# Patient Record
Sex: MALE | Race: WHITE | NOT HISPANIC OR LATINO | URBAN - METROPOLITAN AREA
[De-identification: names, ages, dates, MRNs, and addresses within clinical notes are randomized per-mention and may not be internally consistent; named-entity substitution may affect disease eponyms.]

---

## 2017-01-06 ENCOUNTER — PRE VISIT (OUTPATIENT)
Dept: UROLOGY | Facility: CLINIC | Age: 67
End: 2017-01-06

## 2017-01-16 ENCOUNTER — TRANSFERRED RECORDS (OUTPATIENT)
Dept: HEALTH INFORMATION MANAGEMENT | Facility: CLINIC | Age: 67
End: 2017-01-16

## 2017-01-18 DIAGNOSIS — C22.0 HEPATOCELLULAR CARCINOMA (H): Primary | ICD-10-CM

## 2017-01-19 ENCOUNTER — HOSPITAL ENCOUNTER (OUTPATIENT)
Dept: GENERAL RADIOLOGY | Facility: CLINIC | Age: 67
End: 2017-01-19
Attending: INTERNAL MEDICINE

## 2017-01-20 ENCOUNTER — OFFICE VISIT (OUTPATIENT)
Dept: UROLOGY | Facility: CLINIC | Age: 67
End: 2017-01-20

## 2017-01-20 ENCOUNTER — DOCUMENTATION ONLY (OUTPATIENT)
Dept: TRANSPLANT | Facility: CLINIC | Age: 67
End: 2017-01-20

## 2017-01-20 VITALS
DIASTOLIC BLOOD PRESSURE: 70 MMHG | SYSTOLIC BLOOD PRESSURE: 137 MMHG | WEIGHT: 258 LBS | HEART RATE: 104 BPM | HEIGHT: 69 IN | BODY MASS INDEX: 38.21 KG/M2

## 2017-01-20 DIAGNOSIS — N30.01 ACUTE CYSTITIS WITH HEMATURIA: Primary | ICD-10-CM

## 2017-01-20 ASSESSMENT — PAIN SCALES - GENERAL
PAINLEVEL: NO PAIN (0)
PAINLEVEL: NO PAIN (0)

## 2017-01-20 NOTE — PROGRESS NOTES
Indication: hematuria     January 20, 2017 CYSTOSCOPY:  The patient was brought to the procedures room where he was placed in the supine position.  He was prepped and draped in a sterile fashion.  A #16-Anguillan flexible cystoscope was introduced into the urinary bladder.  The anterior urethra and membranous urethra were negative.  The prostatic urethra was minimally obstructing with a slight enlargement of the lateral lobes.  Within the urinary bladder, there was no evidence of stones, tumors, or growths.  The ureteral orifices were well visualized bilaterally and found to have clear efflux of urine.    The patient had grade 0-1 trabeculation.    On retroflex view, no lesions were seen.       Cleared for liver transplant.   May follow up with Taylor Hernandez as well

## 2017-01-20 NOTE — NURSING NOTE
Invasive Procedure Safety Checklist:    Procedure: cystoscopy     Action: Complete sections and checkboxes as appropriate.    Pre-procedure:  1. Patient ID Verified with 2 identifiers (Sara and  or MRN) : YES    2. Procedure and site verified with patient/designee (when able) : YES    3. Accurate consent documentation in medical record : YES    4. H&P (or appropriate assessment) documented in medical record : NO  H&P must be up to 30 days prior to procedure an updated within 24 hours of                 Procedure as applicable.     5. Relevant diagnostic and radiology test results appropriately labeled and displayed as applicable : YES    6. Blood products, implants, devices, and/or special equipment available for the procedure as applicable : YES    7. Procedure site(s) marked with provider initials [Exclusions: none] : NO    8. Marking not required. Reason : Yes  Procedure does not require site marking    Time Out:     Time-Out performed immediately prior to starting procedure, including verbal and active participation of all team members addressing: YES    1. Correct patient identity.  2. Confirmed that the correct side and site are marked.  3. An accurate procedure to be done.  4. Agreement on the procedure to be done.  5. Correct patient position.  6. Relevant images and results are properly labeled and appropriately displayed.  7. The need to administer antibiotics or fluids for irrigation purposes during the procedure as applicable.  8. Safety precautions based on patient history or medication use.    During Procedure: Verification of correct person, site, and procedure occurs any time the responsibility for care of the patient is transferred to another member of the care team.

## 2017-01-20 NOTE — Clinical Note
1/20/2017       RE: Shun Corona  [address]    [place] [address]     Dear Colleague,    Thank you for referring your patient, Shun Corona, to the [facility] UROLOGY AND [facility] [place] FOR PROSTATE AND UROLOGIC CANCERS at [facility]. Please see a copy of my visit note below.    Indication: hematuria     January 20, 2017 CYSTOSCOPY:  The patient was brought to the procedures room where he was placed in the supine position.  He was prepped and draped in a sterile fashion.  A #16-Romanian flexible cystoscope was introduced into the urinary bladder.  The anterior urethra and membranous urethra were negative.  The prostatic urethra was minimally obstructing with a slight enlargement of the lateral lobes.  Within the urinary bladder, there was no evidence of stones, tumors, or growths.  The ureteral orifices were well visualized bilaterally and found to have clear efflux of urine.    The patient had grade 0-1 trabeculation.    On retroflex view, no lesions were seen.       Cleared for liver transplant.   May follow up with Taylor Hernandez as well       Again, thank you for allowing me to participate in the care of your patient.      Sincerely,    Keli Munson MD

## 2017-01-23 DIAGNOSIS — N40.0 BPH (BENIGN PROSTATIC HYPERPLASIA): Primary | ICD-10-CM

## 2017-01-23 RX ORDER — TAMSULOSIN HYDROCHLORIDE 0.4 MG/1
0.4 CAPSULE ORAL DAILY
Qty: 90 CAPSULE | Refills: 3 | Status: SHIPPED | OUTPATIENT
Start: 2017-01-23

## 2017-01-27 ENCOUNTER — DOCUMENTATION ONLY (OUTPATIENT)
Dept: TRANSPLANT | Facility: CLINIC | Age: 67
End: 2017-01-27

## 2017-01-27 NOTE — PROGRESS NOTES
Following submitted for HCC:    This is the first extension of a previously approved exception that does not meet criteria for automatic approval:  This is a 66 year old with HCV cirrhosis who had a CT on 11/3/15 showing a 5a HCC lesion at 1.5 cm with an AFP of 7 on 11/6/15. The patient had a CT on 12/14/15 showing the lesion had grown to 2.5 cm and was treated with RFA. Follow up MRI on 1/16/17 showed no residual HCC and an AFP of 4. This patient has had a good response to treatment and is within [place] criteria. We are requesting the patient be granted the first HCC exception extension.

## 2017-01-29 ENCOUNTER — TRANSFERRED RECORDS (OUTPATIENT)
Dept: HEALTH INFORMATION MANAGEMENT | Facility: CLINIC | Age: 67
End: 2017-01-29

## 2017-02-07 NOTE — PROGRESS NOTES
1/16/17 MR from [facility]  1.) treatment changes  2.) multiple OPTN 3's    Recs:  - 3 month follow up

## 2017-02-15 DIAGNOSIS — C22.0 HEPATOCELLULAR CARCINOMA (H): ICD-10-CM

## 2017-02-15 DIAGNOSIS — K74.60 CIRRHOSIS OF LIVER WITH ASCITES, UNSPECIFIED HEPATIC CIRRHOSIS TYPE (H): Primary | ICD-10-CM

## 2017-02-15 DIAGNOSIS — K76.82 HEPATIC ENCEPHALOPATHY (H): ICD-10-CM

## 2017-02-15 DIAGNOSIS — R18.8 CIRRHOSIS OF LIVER WITH ASCITES, UNSPECIFIED HEPATIC CIRRHOSIS TYPE (H): Primary | ICD-10-CM

## 2017-02-15 RX ORDER — LACTULOSE 10 G/15ML
30 SOLUTION ORAL 2 TIMES DAILY
Qty: 3000 ML | Refills: 3 | Status: SHIPPED | OUTPATIENT
Start: 2017-02-15 | End: 2017-02-20

## 2017-02-20 ENCOUNTER — TELEPHONE (OUTPATIENT)
Dept: TRANSPLANT | Facility: CLINIC | Age: 67
End: 2017-02-20

## 2017-02-20 RX ORDER — LACTULOSE 10 G/15ML
30 SOLUTION ORAL 2 TIMES DAILY
Qty: 3000 ML | Refills: 3 | Status: SHIPPED | OUTPATIENT
Start: 2017-02-20

## 2017-02-20 NOTE — TELEPHONE ENCOUNTER
"Social Work: Psychosocial Pre-Liver Transplant    D/I: I called Shun to assess his coping and need(s) for community resources.  I had mailed him housing resources a few months ago.  I reminded Shun to provide me/clinic with a copy of his Health Care Directive (he reports he cannot find his original but he gave seven copies out so will have one of those people/agencies send me a copy).  A: Shun reports his wife recently started a  and this is greatly helping their financial situation.  They will plan to move once they have caught up with their housing bills.  Shun is struggling with \"feeling like a burden\" to his wife.  He talks with his sister Nickie about these concerns and is not interested in more formal support at this time.  I did remind him about the Liver Transplant Support Group and encouraged him and his wife to attend.  He is unsure about attending due to the distance and low energy.  Shun struggles with leg cramps and his quality of life is greatly affected.      ADILENE Lombardi, [facility]  Liver Transplant   Phone [phone]  Pager [phone]     "

## 2017-04-17 ENCOUNTER — TRANSFERRED RECORDS (OUTPATIENT)
Dept: HEALTH INFORMATION MANAGEMENT | Facility: CLINIC | Age: 67
End: 2017-04-17

## 2017-04-21 DIAGNOSIS — M10.9 GOUT FLARE: Primary | ICD-10-CM

## 2017-04-21 DIAGNOSIS — C22.0 HEPATOCELLULAR CARCINOMA (H): ICD-10-CM

## 2017-04-21 DIAGNOSIS — K74.60 CIRRHOSIS OF LIVER WITHOUT ASCITES, UNSPECIFIED HEPATIC CIRRHOSIS TYPE (H): ICD-10-CM

## 2017-04-21 DIAGNOSIS — G89.29 CHRONIC PAIN: ICD-10-CM

## 2017-04-21 DIAGNOSIS — M1A.00X0 IDIOPATHIC CHRONIC GOUT: ICD-10-CM

## 2017-04-25 ENCOUNTER — TELEPHONE (OUTPATIENT)
Dept: TRANSPLANT | Facility: CLINIC | Age: 67
End: 2017-04-25

## 2017-04-25 NOTE — TELEPHONE ENCOUNTER
Received request to schedule patient with Rheumatology.  [facility] not scheduling new patients for Gout. Pt scheduled in [place] on 6/12 @ 1420. (Pt offered a sooner appointment at [facility] on 5/5/17 @ 1330. Pt states [place] is too far to drive so refused the [facility] appt.) All appointment information has been sent to the patient via [employer]PS.

## 2017-04-26 ENCOUNTER — HOSPITAL ENCOUNTER (INPATIENT)
Dept: GENERAL RADIOLOGY | Facility: CLINIC | Age: 67
End: 2017-04-26
Attending: INTERNAL MEDICINE

## 2017-04-26 DIAGNOSIS — C22.0 HEPATOCELLULAR CARCINOMA (H): Primary | ICD-10-CM

## 2017-04-28 ENCOUNTER — DOCUMENTATION ONLY (OUTPATIENT)
Dept: TRANSPLANT | Facility: CLINIC | Age: 67
End: 2017-04-28

## 2017-05-05 NOTE — PROGRESS NOTES
4/17/17  1.) Post treatment changes  2.) no recurrence   3.) couple <1cm OPTN 3's    Recs:  - regular HCC follow up - 3 months

## 2017-05-12 ENCOUNTER — DOCUMENTATION ONLY (OUTPATIENT)
Dept: TRANSPLANT | Facility: CLINIC | Age: 67
End: 2017-05-12

## 2017-05-12 NOTE — PROGRESS NOTES
Following submitted for 28 HCC points    This is the 2nd extension of a previously approved exception that does not meet criteria for automatic approval:  This is a 66 year old with HCV cirrhosis who had a CT on 11/3/15 showing a 5a HCC lesion at 1.5 cm with an AFP of 7 on 11/6/15. The patient had a CT on 12/14/15 showing the lesion had grown to 2.5 cm and was treated with RFA. Follow up MRI on 4/17/17 showed no residual HCC and an AFP of 3. This patient has had a good response to treatment and is within [place] criteria. We are requesting the patient be granted the 28 HCC exception points.

## 2017-05-18 ENCOUNTER — ALLIED HEALTH/NURSE VISIT (OUTPATIENT)
Dept: RESEARCH | Facility: CLINIC | Age: 67
End: 2017-05-18

## 2017-05-18 DIAGNOSIS — Z00.6 EXAMINATION OF PARTICIPANT IN CLINICAL TRIAL: Primary | ICD-10-CM

## 2017-05-18 NOTE — NURSING NOTE
Transplant Research Organization Notification of Study Eligibility  Study Name: International Randomized Trial to Evaluate The Effectiveness of the Portable Organ Care System(OCS) Liver For Preserving and Assessing Donor Livers for Transplantation (OCS Liver PROTECT Trial) (IRB #[id number])  Per our IRB approved recruitment process, an eligibility letter and consent for the above study was sent to the patient. He returned a call to ask questions. The patient was called on 5/18/17 to answer questions. The study was discussed in an overview. Pt recommended us to discuss with his sister, a medical professional. Our number was given for her to call.   Please contact the , Jenn Chand, [phone] with any questions.

## 2017-05-18 NOTE — MR AVS SNAPSHOT
"              After Visit Summary   5/18/2017    Shun Corona    MRN: [id number]           Patient Information     Date Of Birth          1950        Visit Information        Provider Department      5/18/2017 11:37 AM Coordinator, [facility] [place],  Clinical Research        Today's Diagnoses     Examination of participant in clinical trial    -  1       Follow-ups after your visit        Your next 10 appointments already scheduled     Jun 12, 2017  2:20 PM CDT   New Visit with Ray Hagen MD   [facility]    [address] [facility]  [place] [address] [phone]              Who to contact     If you have questions or need follow up information about today's clinic visit or your schedule please contact [facility],  CLINICAL RESEARCH directly at [phone].  Normal or non-critical lab and imaging results will be communicated to you by Genesis Networkshart, letter or phone within 4 business days after the clinic has received the results. If you do not hear from us within 7 days, please contact the clinic through MyChart or phone. If you have a critical or abnormal lab result, we will notify you by phone as soon as possible.  Submit refill requests through Optimal+ or call your pharmacy and they will forward the refill request to us. Please allow 3 business days for your refill to be completed.          Additional Information About Your Visit        MyChart Information     Optimal+ lets you send messages to your doctor, view your test results, renew your prescriptions, schedule appointments and more. To sign up, go to [email or web address] . Click on \"Log in\" on the left side of the screen, which will take you to the Welcome page. Then click on \"Sign up Now\" on the right side of the page.     You will be asked to enter the access code listed below, as well as some personal information. Please follow the directions to create your username and " password.     Your access code is: [email or web address]  Expires: 2017 11:43 AM     Your access code will  in 90 days. If you need help or a new code, please call your [facility] or [phone].        Care EveryWhere ID     This is your Care EveryWhere ID. This could be used by other organizations to access your [place] medical records  [id number]         Blood Pressure from Last 3 Encounters:   17 137/70   16 143/73   10/25/16 146/80    Weight from Last 3 Encounters:   17 117 kg (258 lb)   16 112.9 kg (249 lb)   10/25/16 116 kg (255 lb 12.8 oz)              Today, you had the following     No orders found for display       Primary Care Provider Office Phone # Fax #    Luca Espinosa [phone]       [facility] [address] [address]        Thank you!     Thank you for choosing [facility] RESEARCH  for your care. Our goal is always to provide you with excellent care. Hearing back from our patients is one way we can continue to improve our services. Please take a few minutes to complete the written survey that you may receive in the mail after your visit with us. Thank you!             Your Updated Medication List - Protect others around you: Learn how to safely use, store and throw away your medicines at www.disposemymeds.org.          This list is accurate as of: 17 11:43 AM.  Always use your most recent med list.                   Brand Name Dispense Instructions for use    ALLOPURINOL PO      Take 100 mg by mouth daily       AMLODIPINE BESYLATE PO      Take 5 mg by mouth daily       fentaNYL 25 mcg/hr 72 hr patch    DURAGESIC     Place 1 patch onto the skin every 72 hours       FUROSEMIDE PO      Take 40 mg by mouth daily       HYDROCHLOROTHIAZIDE PO      Take 25 mg by mouth daily       lactulose 10 GM/15ML solution    GENERLAC    3000 mL    Take 45 mLs (30 g) by mouth 2 times daily       LISINOPRIL PO      Take 40 mg by mouth  daily       OXYCODONE HCL PO      Take 10 mg by mouth every 6 hours as needed       predniSONE 20 MG tablet    DELTASONE     Take two tablets by mouth for 3 days, then 1 tablet by mouth for 3 days, then 1/2 tablets by mouth 4 days.       propranolol 20 MG tablet    INDERAL     Take 20 mg by mouth       * tamsulosin 0.4 MG capsule    FLOMAX    60 capsule    Take 1 capsule (0.4 mg) by mouth daily       * tamsulosin 0.4 MG capsule    FLOMAX    90 capsule    Take 1 capsule (0.4 mg) by mouth daily       TYLENOL 325 MG tablet   Generic drug:  acetaminophen      Take 325 mg by mouth       XIFAXAN PO      Take 550 mg by mouth 2 times daily       * Notice:  This list has 2 medication(s) that are the same as other medications prescribed for you. Read the directions carefully, and ask your doctor or other care provider to review them with you.

## 2017-05-30 DIAGNOSIS — C22.0 HEPATOCELLULAR CARCINOMA (H): ICD-10-CM

## 2017-05-30 DIAGNOSIS — I10 ESSENTIAL HYPERTENSION: ICD-10-CM

## 2017-05-30 DIAGNOSIS — R18.8 CIRRHOSIS OF LIVER WITH ASCITES, UNSPECIFIED HEPATIC CIRRHOSIS TYPE (H): Primary | ICD-10-CM

## 2017-05-30 DIAGNOSIS — Z76.82 AWAITING LIVER TRANSPLANT: ICD-10-CM

## 2017-05-30 DIAGNOSIS — K74.60 CIRRHOSIS OF LIVER WITH ASCITES, UNSPECIFIED HEPATIC CIRRHOSIS TYPE (H): Primary | ICD-10-CM

## 2017-06-14 ENCOUNTER — ALLIED HEALTH/NURSE VISIT (OUTPATIENT)
Dept: RESEARCH | Facility: CLINIC | Age: 67
End: 2017-06-14

## 2017-06-14 DIAGNOSIS — Z00.6 EXAMINATION OF PARTICIPANT IN CLINICAL TRIAL: Primary | ICD-10-CM

## 2017-06-14 NOTE — MR AVS SNAPSHOT
After Visit Summary   6/14/2017    Shun Corona    MRN: [id number]           Patient Information     Date Of Birth          1950        Visit Information        Provider Department      6/14/2017 3:34 PM Specialty[facility]  [facility]        Today's Diagnoses     Examination of participant in clinical trial    -  1       Follow-ups after your visit        Your next 10 appointments already scheduled     Jun 20, 2017  3:40 PM CDT   New Visit with Ray Hagen MD   [facility] ([facility])    [address] [address]   [phone]            Jun 22, 2017 11:00 AM CDT   Ech Dobutamine Stress Test with [id number]   [facility],  [facility] ([facility])    [address] [id number] [phone]           1.  Please bring or wear a comfortable two-piece outfit and walking shoes. 2.  Stop eating 3 hours before the test. You may drink water or juice. 3.  Stop all caffeine 12 hours before the test. This includes coffee, tea, soda pop, chocolate and certain medicines (such as Anacin and Excederin). Also avoid decaf coffee and tea, as these contain small amounts of caffeine. 4.  No alcohol, smoking or use of other tobacco products for 12 hours before the test. 5.  Refer to your provider instructions to see if you need to stop any medications (such as beta-blockers or nitrates) for this test. 6.  For patients with diabetes: -   If you take insulin, call your diabetes care team. Ask if you should take a   dose the morning of your test. -   If you take diabetes medicine by mouth, don't take it on the morning of your test. Bring it with you to take after the test.  (If you have questions, call your diabetes care team) 7.  When you arrive, please tell us if: -   You have diabetes. -   You have taken Viagra, Cialis or Levitra in the past 48 hours. 8.  For  "any questions that cannot be answered, please contact the ordering physician            2017 12:30 PM CDT   Lab with  LAB   [facility] ([facility])    [address]  1st Floor  [place] [address] [phone]            2017  1:30 PM CDT   (Arrive by 1:15 PM)   RETURN WAIT LIST with Karely Lacy MD   [facility] Solid Organ Transplant ([facility])    [address]  [address] [facility] [address] [phone]              Who to contact     If you have questions or need follow up information about today's clinic visit or your schedule please contact [facility],  CL[facility] directly at [phone].  Normal or non-critical lab and imaging results will be communicated to you by XIPWIREhart, letter or phone within 4 business days after the clinic has received the results. If you do not hear from us within 7 days, please contact the clinic through XIPWIREhart or phone. If you have a critical or abnormal lab result, we will notify you by phone as soon as possible.  Submit refill requests through Yecuris or call your pharmacy and they will forward the refill request to us. Please allow 3 business days for your refill to be completed.          Additional Information About Your Visit        Yecuris Information     Y[employer]is lets you send messages to your doctor, view your test results, renew your prescriptions, schedule appointments and more. To sign up, go to [email or web address] . Click on \"Log in\" on the left side of the screen, which will take you to the Welcome page. Then click on \"Sign up Now\" on the right side of the page.     You will be asked to enter the access code listed below, as well as some personal information. Please follow the directions to create your username and password.     Your access code is: [email or web address]  Expires: 2017 11:43 AM     Your access code will  in 90 days. If you need " help or a new code, please call your [place] clinic or [phone].        Care EveryWhere ID     This is your Care EveryWhere ID. This could be used by other organizations to access your [place] medical records  [id number]         Blood Pressure from Last 3 Encounters:   01/20/17 137/70   11/22/16 143/73   10/25/16 146/80    Weight from Last 3 Encounters:   01/20/17 117 kg (258 lb)   11/22/16 112.9 kg (249 lb)   10/25/16 116 kg (255 lb 12.8 oz)              Today, you had the following     No orders found for display       Primary Care Provider Office Phone # Fax #    Luca CHAKRABORTY Jesús [phone]       [facility] [address]  [place] [address]        Thank you!     Thank you for choosing [facility],  CLINICAL RESEARCH  for your care. Our goal is always to provide you with excellent care. Hearing back from our patients is one way we can continue to improve our services. Please take a few minutes to complete the written survey that you may receive in the mail after your visit with us. Thank you!             Your Updated Medication List - Protect others around you: Learn how to safely use, store and throw away your medicines at www.disposemymeds.org.          This list is accurate as of: 6/14/17  3:36 PM.  Always use your most recent med list.                   Brand Name Dispense Instructions for use    ALLOPURINOL PO      Take 100 mg by mouth daily       AMLODIPINE BESYLATE PO      Take 5 mg by mouth daily       fentaNYL 25 mcg/hr 72 hr patch    DURAGESIC     Place 1 patch onto the skin every 72 hours       FUROSEMIDE PO      Take 40 mg by mouth daily       HYDROCHLOROTHIAZIDE PO      Take 25 mg by mouth daily       lactulose 10 GM/15ML solution    GENERLAC    3000 mL    Take 45 mLs (30 g) by mouth 2 times daily       LISINOPRIL PO      Take 40 mg by mouth daily       OXYCODONE HCL PO      Take 10 mg by mouth every 6 hours as needed       predniSONE 20 MG tablet    DELTASONE     Take two  tablets by mouth for 3 days, then 1 tablet by mouth for 3 days, then 1/2 tablets by mouth 4 days.       propranolol 20 MG tablet    INDERAL     Take 20 mg by mouth       * tamsulosin 0.4 MG capsule    FLOMAX    60 capsule    Take 1 capsule (0.4 mg) by mouth daily       * tamsulosin 0.4 MG capsule    FLOMAX    90 capsule    Take 1 capsule (0.4 mg) by mouth daily       TYLENOL 325 MG tablet   Generic drug:  acetaminophen      Take 325 mg by mouth       XIFAXAN PO      Take 550 mg by mouth 2 times daily       * Notice:  This list has 2 medication(s) that are the same as other medications prescribed for you. Read the directions carefully, and ask your doctor or other care provider to review them with you.

## 2017-06-14 NOTE — PROGRESS NOTES
Study Name: International Randomized Trial to Evaluate The Effectiveness of the Portable Organ Care System(OCS) Liver For Preserving and Assessing Donor Livers for Transplantation (OCS Liver PROTECT Trial) (IRB #[id number])  Per our IRB approved recruitment process,the patient was called on 6/14/2017  to discuss the study and answer any questions. Coordinator will plan on meeting the patient in clinic to discuss the study in more detail.    Please contact the study coordinators, Jennifer Euceda ([phone]) and Bethany Rainey ([phone]) with any questions.    .    .

## 2017-06-20 ENCOUNTER — RADIANT APPOINTMENT (OUTPATIENT)
Dept: GENERAL RADIOLOGY | Facility: CLINIC | Age: 67
End: 2017-06-20
Attending: INTERNAL MEDICINE
Payer: COMMERCIAL

## 2017-06-20 ENCOUNTER — OFFICE VISIT (OUTPATIENT)
Dept: RHEUMATOLOGY | Facility: CLINIC | Age: 67
End: 2017-06-20
Payer: COMMERCIAL

## 2017-06-20 VITALS
HEART RATE: 95 BPM | WEIGHT: 285.6 LBS | BODY MASS INDEX: 42.3 KG/M2 | SYSTOLIC BLOOD PRESSURE: 176 MMHG | HEIGHT: 69 IN | OXYGEN SATURATION: 90 % | TEMPERATURE: 95.5 F | DIASTOLIC BLOOD PRESSURE: 95 MMHG

## 2017-06-20 DIAGNOSIS — E66.01 MORBID OBESITY WITH BODY MASS INDEX OF 40.0-49.9 (H): ICD-10-CM

## 2017-06-20 DIAGNOSIS — M06.4 INFLAMMATORY POLYARTHROPATHY (H): ICD-10-CM

## 2017-06-20 DIAGNOSIS — M25.552 HIP PAIN, LEFT: ICD-10-CM

## 2017-06-20 DIAGNOSIS — M06.4 INFLAMMATORY POLYARTHROPATHY (H): Primary | ICD-10-CM

## 2017-06-20 DIAGNOSIS — M1A.09X0 IDIOPATHIC CHRONIC GOUT OF MULTIPLE SITES WITHOUT TOPHUS: ICD-10-CM

## 2017-06-20 DIAGNOSIS — Z79.899 HIGH RISK MEDICATION USE: ICD-10-CM

## 2017-06-20 LAB
ALBUMIN SERPL-MCNC: 3.3 G/DL (ref 3.4–5)
ALP SERPL-CCNC: 130 U/L (ref 40–150)
ALT SERPL W P-5'-P-CCNC: 40 U/L (ref 0–70)
ANION GAP SERPL CALCULATED.3IONS-SCNC: 7 MMOL/L (ref 3–14)
AST SERPL W P-5'-P-CCNC: 29 U/L (ref 0–45)
BASOPHILS # BLD AUTO: 0 10E9/L (ref 0–0.2)
BASOPHILS NFR BLD AUTO: 0.4 %
BILIRUB SERPL-MCNC: 0.4 MG/DL (ref 0.2–1.3)
BUN SERPL-MCNC: 23 MG/DL (ref 7–30)
CALCIUM SERPL-MCNC: 8.8 MG/DL (ref 8.5–10.1)
CHLORIDE SERPL-SCNC: 103 MMOL/L (ref 94–109)
CO2 SERPL-SCNC: 28 MMOL/L (ref 20–32)
CREAT SERPL-MCNC: 1.04 MG/DL (ref 0.66–1.25)
CRP SERPL-MCNC: 6.7 MG/L (ref 0–8)
DIFFERENTIAL METHOD BLD: ABNORMAL
EOSINOPHIL # BLD AUTO: 0.3 10E9/L (ref 0–0.7)
EOSINOPHIL NFR BLD AUTO: 4.8 %
ERYTHROCYTE [DISTWIDTH] IN BLOOD BY AUTOMATED COUNT: 14 % (ref 10–15)
ERYTHROCYTE [SEDIMENTATION RATE] IN BLOOD BY WESTERGREN METHOD: 27 MM/H (ref 0–20)
GFR SERPL CREATININE-BSD FRML MDRD: 72 ML/MIN/1.7M2
GLUCOSE SERPL-MCNC: 110 MG/DL (ref 70–99)
HCT VFR BLD AUTO: 36.2 % (ref 40–53)
HGB BLD-MCNC: 12.4 G/DL (ref 13.3–17.7)
LYMPHOCYTES # BLD AUTO: 1.1 10E9/L (ref 0.8–5.3)
LYMPHOCYTES NFR BLD AUTO: 19.3 %
MCH RBC QN AUTO: 27.9 PG (ref 26.5–33)
MCHC RBC AUTO-ENTMCNC: 34.3 G/DL (ref 31.5–36.5)
MCV RBC AUTO: 81 FL (ref 78–100)
MONOCYTES # BLD AUTO: 0.7 10E9/L (ref 0–1.3)
MONOCYTES NFR BLD AUTO: 11.6 %
NEUTROPHILS # BLD AUTO: 3.6 10E9/L (ref 1.6–8.3)
NEUTROPHILS NFR BLD AUTO: 63.9 %
PLATELET # BLD AUTO: 131 10E9/L (ref 150–450)
POTASSIUM SERPL-SCNC: 4.1 MMOL/L (ref 3.4–5.3)
PROT SERPL-MCNC: 7.4 G/DL (ref 6.8–8.8)
RBC # BLD AUTO: 4.45 10E12/L (ref 4.4–5.9)
SODIUM SERPL-SCNC: 138 MMOL/L (ref 133–144)
URATE SERPL-MCNC: 5.2 MG/DL (ref 3.5–7.2)
WBC # BLD AUTO: 5.6 10E9/L (ref 4–11)

## 2017-06-20 PROCEDURE — 85652 RBC SED RATE AUTOMATED: CPT | Performed by: INTERNAL MEDICINE

## 2017-06-20 PROCEDURE — 86200 CCP ANTIBODY: CPT | Performed by: INTERNAL MEDICINE

## 2017-06-20 PROCEDURE — 36415 COLL VENOUS BLD VENIPUNCTURE: CPT | Performed by: INTERNAL MEDICINE

## 2017-06-20 PROCEDURE — 99204 OFFICE O/P NEW MOD 45 MIN: CPT | Performed by: INTERNAL MEDICINE

## 2017-06-20 PROCEDURE — 86140 C-REACTIVE PROTEIN: CPT | Performed by: INTERNAL MEDICINE

## 2017-06-20 PROCEDURE — 87522 HEPATITIS C REVRS TRNSCRPJ: CPT | Performed by: INTERNAL MEDICINE

## 2017-06-20 PROCEDURE — 84550 ASSAY OF BLOOD/URIC ACID: CPT | Performed by: INTERNAL MEDICINE

## 2017-06-20 PROCEDURE — 73502 X-RAY EXAM HIP UNI 2-3 VIEWS: CPT

## 2017-06-20 PROCEDURE — 73130 X-RAY EXAM OF HAND: CPT | Mod: RT

## 2017-06-20 PROCEDURE — 80053 COMPREHEN METABOLIC PANEL: CPT | Performed by: INTERNAL MEDICINE

## 2017-06-20 PROCEDURE — 73630 X-RAY EXAM OF FOOT: CPT | Mod: LT

## 2017-06-20 PROCEDURE — 85025 COMPLETE CBC W/AUTO DIFF WBC: CPT | Performed by: INTERNAL MEDICINE

## 2017-06-20 PROCEDURE — 86431 RHEUMATOID FACTOR QUANT: CPT | Performed by: INTERNAL MEDICINE

## 2017-06-20 RX ORDER — HYDROXYCHLOROQUINE SULFATE 200 MG/1
400 TABLET, FILM COATED ORAL DAILY
Qty: 60 TABLET | Refills: 1 | Status: SHIPPED | OUTPATIENT
Start: 2017-06-20 | End: 2017-09-05

## 2017-06-20 RX ORDER — ALLOPURINOL 100 MG/1
200 TABLET ORAL DAILY
Qty: 60 TABLET | Refills: 1 | Status: SHIPPED | OUTPATIENT
Start: 2017-06-20 | End: 2017-09-05

## 2017-06-20 RX ORDER — PREDNISONE 20 MG/1
TABLET ORAL
Qty: 16 TABLET | Refills: 0 | Status: SHIPPED | OUTPATIENT
Start: 2017-06-20 | End: 2017-07-31

## 2017-06-20 NOTE — MR AVS SNAPSHOT
After Visit Summary   2017    Shun Corona    MRN: [id number]           Patient Information     Date Of Birth          1950        Visit Information        Provider Department      2017 3:40 PM Ray Hagen MD [facility]        Today's Diagnoses     Inflammatory polyarthropathy (H)    -  1    Idiopathic chronic gout of multiple sites without tophus        High risk medication use          Care Instructions      Dr. Hagen s Rheumatology Clinics  Locations Clinic Hours Telephone Number   [facility]  [address]  [place] [address]     Wednesday: 7:20AM - 4:00PM  Thursday:     7:20AM - 4:00PM     Friday:          7:20AM - 11:00AM       To schedule an appointment with  Dr. Hagen,  please contact  Specialty [phone]       [facility]  [address]  [place] [address]       Monday:       7:20AM - 4:00PM      [facility]  [address] [place] [address]       Tuesday:      7:20AM - 4:00PM          Thank you!    Erika Matos CMA              Follow-ups after your visit        Additional Services     OPHTHALMOLOGY ADULT REFERRAL       Your provider has referred you to:  [facility] [facility] [phone]   [email or web address]  [facility]: [facility] [phone]   [email or web address]    Reason for referral: Hydroxychloroquine (plaquenil) toxicity monitoring.     Please be aware that coverage of these services is subject to the terms and limitations of your health insurance plan.  Call member services at your health plan with any benefit or coverage questions.      Please bring the following to your appointment:  >>   Any x-rays, CTs or MRIs which have been performed.  Contact the facility where they were done to arrange for  prior to your scheduled appointment.  Any new  CT, MRI or other procedures ordered by your specialist must be performed at a [place] facility or coordinated by your clinic's referral office.    >>   List of current medications   >>   This referral request   >>   Any documents/labs given to you for this referral                  Your next 10 appointments already scheduled     Jun 22, 2017 11:00 AM CDT   Ech Dobutamine Stress Test with [id number]   [facility], [place],  Echocardiography ([facility], [facility])    [address]  [facility] [id number] [phone]           1.  Please bring or wear a comfortable two-piece outfit and walking shoes. 2.  Stop eating 3 hours before the test. You may drink water or juice. 3.  Stop all caffeine 12 hours before the test. This includes coffee, tea, soda pop, chocolate and certain medicines (such as Anacin and Excederin). Also avoid decaf coffee and tea, as these contain small amounts of caffeine. 4.  No alcohol, smoking or use of other tobacco products for 12 hours before the test. 5.  Refer to your provider instructions to see if you need to stop any medications (such as beta-blockers or nitrates) for this test. 6.  For patients with diabetes: -   If you take insulin, call your diabetes care team. Ask if you should take a   dose the morning of your test. -   If you take diabetes medicine by mouth, don't take it on the morning of your test. Bring it with you to take after the test.  (If you have questions, call your diabetes care team) 7.  When you arrive, please tell us if: -   You have diabetes. -   You have taken Viagra, Cialis or Levitra in the past 48 hours. 8.  For any questions that cannot be answered, please contact the ordering physician            Jun 22, 2017 12:30 PM CDT   Lab with  LAB    Health [facility] ([facility])    [address]  [address] [facility] [address] [phone]            Jun 22, 2017  1:30 PM CDT   (Arrive by 1:15 PM)   RETURN  "WAIT LIST with Karely Lacy MD   [facility] ([facility])    [address]  [address] [facility] [address] [phone]              Future tests that were ordered for you today     Open Future Orders        Priority Expected Expires Ordered    XR Hand Bilateral G/E 3 Views Routine 6/20/2017 6/20/2018 6/20/2017    XR Foot Bilateral G/E 3 Views Routine 6/20/2017 6/20/2018 6/20/2017    XR Hip Left 2-3 Views Routine 6/20/2017 6/20/2018 6/20/2017    Comprehensive metabolic panel Routine 7/17/2017 7/20/2017 6/20/2017    CBC with platelets differential Routine 7/17/2017 7/20/2017 6/20/2017    Uric acid Routine 7/17/2017 7/20/2017 6/20/2017            Who to contact     If you have questions or need follow up information about today's clinic visit or your schedule please contact [facility] directly at [phone].  Normal or non-critical lab and imaging results will be communicated to you by Android App Review [employer], letter or phone within 4 business days after the clinic has received the results. If you do not hear from us within 7 days, please contact the clinic through [employer] or phone. If you have a critical or abnormal lab result, we will notify you by phone as soon as possible.  Submit refill requests through "[employer] or call your pharmacy and they will forward the refill request to us. Please allow 3 business days for your refill to be completed.          Additional Information About Your Visit        Android App Review Sourcehart Information     "[employer] lets you send messages to your doctor, view your test results, renew your prescriptions, schedule appointments and more. To sign up, go to [email or web address] . Click on \"Log in\" on the left side of the screen, which will take you to the Welcome page. Then click on \"Sign up Now\" on the right side of the page.     You will be asked to enter the access code listed below, as well as some personal information. Please " "follow the directions to create your username and password.     Your access code is: [email or web address]  Expires: 2017 11:43 AM     Your access code will  in 90 days. If you need help or a new code, please call your [place] clinic or [phone].        Care EveryWhere ID     This is your Care EveryWhere ID. This could be used by other organizations to access your [place] medical records  [id number]        Your Vitals Were     Pulse Temperature Height Pulse Oximetry BMI (Body Mass Index)       95 95.5  F (35.3  C) (Oral) 1.753 m (5' 9\") 90% 42.18 kg/m2        Blood Pressure from Last 3 Encounters:   17 (!) 176/95   17 137/70   16 143/73    Weight from Last 3 Encounters:   17 129.5 kg (285 lb 9.6 oz)   17 117 kg (258 lb)   16 112.9 kg (249 lb)              We Performed the Following     CBC with platelets differential     Comprehensive metabolic panel     CRP inflammation     Cyclic Citrullinated Peptide Antibody IgG     Erythrocyte sedimentation rate auto     Hepatitis C RNA quantitative     OPHTHALMOLOGY ADULT REFERRAL     Rheumatoid factor     Uric acid          Today's Medication Changes          These changes are accurate as of: 17  4:40 PM.  If you have any questions, ask your nurse or doctor.               Start taking these medicines.        Dose/Directions    hydroxychloroquine 200 MG tablet   Commonly known as:  PLAQUENIL   Used for:  Inflammatory polyarthropathy (H)   Started by:  Ray Hagen MD        Dose:  400 mg   Take 2 tablets (400 mg) by mouth daily   Quantity:  60 tablet   Refills:  1         These medicines have changed or have updated prescriptions.        Dose/Directions    allopurinol 100 MG tablet   Commonly known as:  ZYLOPRIM   This may have changed:  how much to take   Used for:  Idiopathic chronic gout of multiple sites without tophus   Changed by:  Ray Hagen MD        Dose:  200 mg   Take 2 tablets (200 mg) by mouth daily "   Quantity:  60 tablet   Refills:  1       * predniSONE 20 MG tablet   Commonly known as:  DELTASONE   This may have changed:  Another medication with the same name was added. Make sure you understand how and when to take each.   Changed by:  Michelle Hernandez PA        Take two tablets by mouth for 3 days, then 1 tablet by mouth for 3 days, then 1/2 tablets by mouth 4 days.   Refills:  0       * predniSONE 20 MG tablet   Commonly known as:  DELTASONE   This may have changed:  You were already taking a medication with the same name, and this prescription was added. Make sure you understand how and when to take each.   Used for:  Idiopathic chronic gout of multiple sites without tophus   Changed by:  Ray Hgaen MD        For gout flare only: 60mg daily x2days, then 40mg daily x5days, then stop.   Quantity:  16 tablet   Refills:  0       * Notice:  This list has 2 medication(s) that are the same as other medications prescribed for you. Read the directions carefully, and ask your doctor or other care provider to review them with you.         Where to get your medicines      These medications were sent to [facility] [address] [place] [address] - [address] [facility]  [address] [facility] [address] [facility] [address]     Phone:  [phone]     allopurinol 100 MG tablet    hydroxychloroquine 200 MG tablet    predniSONE 20 MG tablet                Primary Care Provider Office Phone # Fax #    Luca Espinosa [phone]       [facility] [address] [facility] [place] [address]        Thank you!     Thank you for choosing [facility]  for your care. Our goal is always to provide you with excellent care. Hearing back from our patients is one way we can continue to improve our services. Please take a few minutes to complete the written survey that you may receive in the mail after your visit with us. Thank you!             Your Updated Medication List - Protect others around  you: Learn how to safely use, store and throw away your medicines at www.disposemymeds.org.          This list is accurate as of: 6/20/17  4:40 PM.  Always use your most recent med list.                   Brand Name Dispense Instructions for use    allopurinol 100 MG tablet    ZYLOPRIM    60 tablet    Take 2 tablets (200 mg) by mouth daily       AMLODIPINE BESYLATE PO      Take 5 mg by mouth daily       fentaNYL 25 mcg/hr 72 hr patch    DURAGESIC     Place 1 patch onto the skin every 72 hours       FUROSEMIDE PO      Take 40 mg by mouth daily       HYDROCHLOROTHIAZIDE PO      Take 25 mg by mouth daily       hydroxychloroquine 200 MG tablet    PLAQUENIL    60 tablet    Take 2 tablets (400 mg) by mouth daily       lactulose 10 GM/15ML solution    GENERLAC    3000 mL    Take 45 mLs (30 g) by mouth 2 times daily       LISINOPRIL PO      Take 40 mg by mouth daily       OXYCODONE HCL PO      Take 10 mg by mouth every 6 hours as needed       * predniSONE 20 MG tablet    DELTASONE     Take two tablets by mouth for 3 days, then 1 tablet by mouth for 3 days, then 1/2 tablets by mouth 4 days.       * predniSONE 20 MG tablet    DELTASONE    16 tablet    For gout flare only: 60mg daily x2days, then 40mg daily x5days, then stop.       propranolol 20 MG tablet    INDERAL     Take 20 mg by mouth       * tamsulosin 0.4 MG capsule    FLOMAX    60 capsule    Take 1 capsule (0.4 mg) by mouth daily       * tamsulosin 0.4 MG capsule    FLOMAX    90 capsule    Take 1 capsule (0.4 mg) by mouth daily       TYLENOL 325 MG tablet   Generic drug:  acetaminophen      Take 325 mg by mouth       XIFAXAN PO      Take 550 mg by mouth 2 times daily       * Notice:  This list has 4 medication(s) that are the same as other medications prescribed for you. Read the directions carefully, and ask your doctor or other care provider to review them with you.

## 2017-06-20 NOTE — Clinical Note
Please fax my clinic note dated 6/20/2017 to Mr. Corona's PCP and hepatologist:  PCP: Dr. Luca Espinosa at [facility]  Hepatologist: Dr. Paul Cadena at [facility]  Thank you, Ray Hagen MD 6/20/2017 5:31 PM
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
## 2017-06-20 NOTE — PATIENT INSTRUCTIONS
Dr. Hagen s Rheumatology Clinics  Locations Clinic Hours Telephone Number   [facility]  [address]  [place] [id number]     Wednesday: 7:20AM - 4:00PM  Thursday:     7:20AM - 4:00PM     Friday:          7:20AM - 11:00AM       To schedule an appointment with  Dr. Hagen,  please contact  Specialty [phone]       [facility]  [address]  [place] [id number]       Monday:       7:20AM - 4:00PM      [facility]  [address]  [place] [address]       Tuesday:      7:20AM - 4:00PM          Thank you!    Erika Matos CMA

## 2017-06-20 NOTE — NURSING NOTE
"Chief Complaint   Patient presents with     Consult     patient has chronic gout, legs are swollen, hips hurt and right wrist       Initial BP (!) 176/95 (BP Location: Left arm, Patient Position: Chair, Cuff Size: Adult Large)  Pulse 95  Temp 95.5  F (35.3  C) (Oral)  Ht 1.753 m (5' 9\")  Wt 129.5 kg (285 lb 9.6 oz)  SpO2 90%  BMI 42.18 kg/m2 Estimated body mass index is 42.18 kg/(m^2) as calculated from the following:    Height as of this encounter: 1.753 m (5' 9\").    Weight as of this encounter: 129.5 kg (285 lb 9.6 oz).  BP completed using cuff size: large         RAPID3 (0-30) Cumulative Score            RAPID3 Weighted Score (divide #4 by 3 and that is the weighted score)           "

## 2017-06-21 LAB — RHEUMATOID FACT SER NEPH-ACNC: <20 IU/ML (ref 0–20)

## 2017-06-22 ENCOUNTER — ALLIED HEALTH/NURSE VISIT (OUTPATIENT)
Dept: RESEARCH | Facility: CLINIC | Age: 67
End: 2017-06-22

## 2017-06-22 ENCOUNTER — HOSPITAL ENCOUNTER (OUTPATIENT)
Dept: CARDIOLOGY | Facility: CLINIC | Age: 67
Discharge: HOME | End: 2017-06-22
Attending: INTERNAL MEDICINE | Admitting: INTERNAL MEDICINE
Payer: MEDICARE

## 2017-06-22 ENCOUNTER — OFFICE VISIT (OUTPATIENT)
Dept: TRANSPLANT | Facility: CLINIC | Age: 67
End: 2017-06-22
Attending: TRANSPLANT SURGERY
Payer: MEDICARE

## 2017-06-22 VITALS
OXYGEN SATURATION: 98 % | DIASTOLIC BLOOD PRESSURE: 88 MMHG | TEMPERATURE: 98.4 F | WEIGHT: 285.5 LBS | HEIGHT: 69 IN | HEART RATE: 78 BPM | BODY MASS INDEX: 42.29 KG/M2 | SYSTOLIC BLOOD PRESSURE: 152 MMHG | RESPIRATION RATE: 16 BRPM

## 2017-06-22 DIAGNOSIS — C22.0 HEPATOCELLULAR CARCINOMA (H): ICD-10-CM

## 2017-06-22 DIAGNOSIS — K74.60 CIRRHOSIS OF LIVER WITHOUT ASCITES, UNSPECIFIED HEPATIC CIRRHOSIS TYPE (H): ICD-10-CM

## 2017-06-22 DIAGNOSIS — K74.60 HEPATIC CIRRHOSIS, UNSPECIFIED HEPATIC CIRRHOSIS TYPE (H): Primary | ICD-10-CM

## 2017-06-22 DIAGNOSIS — K74.60 CIRRHOSIS OF LIVER WITH ASCITES, UNSPECIFIED HEPATIC CIRRHOSIS TYPE (H): ICD-10-CM

## 2017-06-22 DIAGNOSIS — Z76.82 AWAITING LIVER TRANSPLANT: ICD-10-CM

## 2017-06-22 DIAGNOSIS — Z00.6 EXAMINATION OF PARTICIPANT IN CLINICAL TRIAL: Primary | ICD-10-CM

## 2017-06-22 DIAGNOSIS — I10 ESSENTIAL HYPERTENSION: ICD-10-CM

## 2017-06-22 DIAGNOSIS — R18.8 CIRRHOSIS OF LIVER WITH ASCITES, UNSPECIFIED HEPATIC CIRRHOSIS TYPE (H): ICD-10-CM

## 2017-06-22 DIAGNOSIS — G89.29 OTHER CHRONIC PAIN: ICD-10-CM

## 2017-06-22 LAB
AFP SERPL-MCNC: 4.3 UG/L (ref 0–8)
ALBUMIN SERPL-MCNC: 3.3 G/DL (ref 3.4–5)
BILIRUB SERPL-MCNC: 0.2 MG/DL (ref 0.2–1.3)
CREAT SERPL-MCNC: 0.99 MG/DL (ref 0.66–1.25)
GFR SERPL CREATININE-BSD FRML MDRD: 76 ML/MIN/1.7M2
HCV RNA SERPL NAA+PROBE-ACNC: NORMAL [IU]/ML
HCV RNA SERPL NAA+PROBE-LOG IU: NORMAL LOG IU/ML
INR PPP: 1.13 (ref 0.86–1.14)
SODIUM SERPL-SCNC: 136 MMOL/L (ref 133–144)

## 2017-06-22 PROCEDURE — 25500064 ZZH RX 255 OP 636: Performed by: INTERNAL MEDICINE

## 2017-06-22 PROCEDURE — 25000125 ZZHC RX 250: Performed by: INTERNAL MEDICINE

## 2017-06-22 PROCEDURE — 82040 ASSAY OF SERUM ALBUMIN: CPT | Performed by: INTERNAL MEDICINE

## 2017-06-22 PROCEDURE — 82565 ASSAY OF CREATININE: CPT | Performed by: INTERNAL MEDICINE

## 2017-06-22 PROCEDURE — 84295 ASSAY OF SERUM SODIUM: CPT | Performed by: INTERNAL MEDICINE

## 2017-06-22 PROCEDURE — 82247 BILIRUBIN TOTAL: CPT | Performed by: INTERNAL MEDICINE

## 2017-06-22 PROCEDURE — 82105 ALPHA-FETOPROTEIN SERUM: CPT | Performed by: INTERNAL MEDICINE

## 2017-06-22 PROCEDURE — 36415 COLL VENOUS BLD VENIPUNCTURE: CPT | Performed by: INTERNAL MEDICINE

## 2017-06-22 PROCEDURE — 93321 DOPPLER ECHO F-UP/LMTD STD: CPT | Mod: 26 | Performed by: INTERNAL MEDICINE

## 2017-06-22 PROCEDURE — 40000264 ECHO STRESS DOBUTAMINE WITH OPTISON

## 2017-06-22 PROCEDURE — 25000128 H RX IP 250 OP 636: Performed by: INTERNAL MEDICINE

## 2017-06-22 PROCEDURE — 93016 CV STRESS TEST SUPVJ ONLY: CPT | Performed by: INTERNAL MEDICINE

## 2017-06-22 PROCEDURE — 85610 PROTHROMBIN TIME: CPT | Performed by: INTERNAL MEDICINE

## 2017-06-22 PROCEDURE — 93018 CV STRESS TEST I&R ONLY: CPT | Performed by: INTERNAL MEDICINE

## 2017-06-22 PROCEDURE — 80321 ALCOHOLS BIOMARKERS 1OR 2: CPT | Performed by: INTERNAL MEDICINE

## 2017-06-22 PROCEDURE — 93325 DOPPLER ECHO COLOR FLOW MAPG: CPT | Mod: 26 | Performed by: INTERNAL MEDICINE

## 2017-06-22 PROCEDURE — 93350 STRESS TTE ONLY: CPT | Mod: 26 | Performed by: INTERNAL MEDICINE

## 2017-06-22 RX ORDER — METOPROLOL TARTRATE 1 MG/ML
15 INJECTION, SOLUTION INTRAVENOUS
Status: DISCONTINUED | OUTPATIENT
Start: 2017-06-22 | End: 2017-06-23 | Stop reason: HOSPADM

## 2017-06-22 RX ORDER — DOBUTAMINE HYDROCHLORIDE 200 MG/100ML
5-50 INJECTION INTRAVENOUS CONTINUOUS
Status: DISCONTINUED | OUTPATIENT
Start: 2017-06-22 | End: 2017-06-23 | Stop reason: HOSPADM

## 2017-06-22 RX ADMIN — ATROPINE SULFATE 0.2 MG: 0.4 INJECTION, SOLUTION INTRAMUSCULAR; INTRAVENOUS; SUBCUTANEOUS at 12:31

## 2017-06-22 RX ADMIN — DOBUTAMINE HYDROCHLORIDE 10 MCG/KG/MIN: 200 INJECTION INTRAVENOUS at 12:27

## 2017-06-22 RX ADMIN — METOPROLOL TARTRATE 5 MG: 5 INJECTION INTRAVENOUS at 12:33

## 2017-06-22 RX ADMIN — HUMAN ALBUMIN MICROSPHERES AND PERFLUTREN 11 ML: 10; .22 INJECTION, SOLUTION INTRAVENOUS at 12:37
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
## 2017-06-22 NOTE — PROGRESS NOTES
Rheumatology team: Please call to notify Mr. Corona that he has mild osteoarthritis in his hands and left hip.  No all labs have returned yet.     Ray Hagen MD  6/22/2017 10:09 AM

## 2017-06-22 NOTE — PROGRESS NOTES
Transplant Research Organization Informed Consent Process Documentation  Study Name: International Randomized Trial to Evaluate the Effectiveness of the Portable Organ Care System (OCS) Liver for Preserving and Assessing Donor Livers for Transplantation (OCS Liver PROTECT Trial) (IRB # [id number])  ICF Version Date / IRB Approval Date:  Version dt 02/21/2017, IRB Approved 02/21/2017   Contacts: Jennifer Euceda ([phone]) and Bethany Rainey ([phone])     Date of Approach/Consent: 6/22/2017    The subject was screened and meets all of the inclusion criteria and none of the exclusion criteria is met.    The subject was told:  -that the study involves research  -the purpose of the research study  -the expected duration of the study and the approximate number of subject sought  -of procedures that are identified as experimental  -of reasonably foreseeable risks or discomforts to the subject  -of any benefits to the subject or others that may be expected from the research  -of alternative procedures and/or treatment  -how the confidentiality of records would be maintained  -whether or not compensation and medical treatments are available should injury occur as a result of the study  -who to contact if they have questions related to the research study or questions regarding research subjects' rights  -that participation is completely voluntary and that their decision to or not to participate will have no impact on their relationships with the N and the staff    No study procedures were completed prior to the consent being obtained.  The use of historical information (lab or assessments) used for the purpose of the study was approved by subject.  The subject was fully aware that we would be reviewing their medical record for the study.  The subject demonstrated an understanding of what the study involved.  Specifically, how this study differed from standard of care at our center and what was required  of the subject as part of the study.  The subject reviewed the consent form and was given the opportunity to ask questions before signing.  Questions and concerns were answered by the study staff and/or study physician.  A copy of the signed informed consent document was provided to the subject.  [x] Yes [] No  The consent require the use of a :   [] Yes [x]  No     A 'short form' consent was used:     [] Yes [x] No         If yes, provide name of witness: N/A  The subject required a legally-authorized representative (LAR) to sign on their behalf:                                                    [] Yes  [x] No   If yes, please record name of LAR: N/A    Questions to Evaluate Subject Comprehension of Study  Adult or Legally Authorized Representative (LAR) for a Dependent:  Question: Adequate Response? If No, explain what actions were taken   What is being studied? [x] Yes  [] No   If you participate, what will be different than if you decide not to participate?  [x] Yes  [] No   How long will the study last; will you be required to return for visits? [x] Yes  [] No   What kinds of risks are these? [x] Yes  [] No

## 2017-06-22 NOTE — MR AVS SNAPSHOT
"              After Visit Summary   6/22/2017    Shun Corona    MRN: [id number]           Patient Information     Date Of Birth          1950        Visit Information        Provider Department      6/22/2017 1:30 PM Karely Lacy MD [facility] Solid Organ Transplant        Today's Diagnoses     Hepatic cirrhosis, unspecified hepatic cirrhosis type (H)    -  1       Follow-ups after your visit        Future tests that were ordered for you today     Open Future Orders        Priority Expected Expires Ordered    ECHO STRESS DOBUTAMINE WITH OPTISON Routine  5/30/2018 5/30/2017            Who to contact     If you have questions or need follow up information about today's clinic visit or your schedule please contact [facility] SOLID ORGAN TRANSPLANT directly at [phone].  Normal or non-critical lab and imaging results will be communicated to you by [employer], letter or phone within 4 business days after the clinic has received the results. If you do not hear from us within 7 days, please contact the clinic through [employer] or phone. If you have a critical or abnormal lab result, we will notify you by phone as soon as possible.  Submit refill requests through Timeet or call your pharmacy and they will forward the refill request to us. Please allow 3 business days for your refill to be completed.          Additional Information About Your Visit        [employer] Medical Information     Timeet lets you send messages to your doctor, view your test results, renew your prescriptions, schedule appointments and more. To sign up, go to [email or web address] . Click on \"Log in\" on the left side of the screen, which will take you to the Welcome page. Then click on \"Sign up Now\" on the right side of the page.     You will be asked to enter the access code listed below, as well as some personal information. Please follow the directions to create your username and password.     Your access code is: [email or web address]  Expires: " "2017 11:43 AM     Your access code will  in 90 days. If you need help or a new code, please call your [facility] or [phone].        Care EveryWhere ID     This is your Care EveryWhere ID. This could be used by other organizations to access your [place] medical records  [id number]        Your Vitals Were     Pulse Temperature Respirations Height Pulse Oximetry BMI (Body Mass Index)    78 98.4  F (36.9  C) (Oral) 16 1.753 m (5' 9\") 98% 42.16 kg/m2       Blood Pressure from Last 3 Encounters:   17 152/88   17 (!) 176/95   17 137/70    Weight from Last 3 Encounters:   17 129.5 kg (285 lb 7.9 oz)   17 129.5 kg (285 lb 9.6 oz)   17 117 kg (258 lb)              Today, you had the following     No orders found for display         Today's Medication Changes          These changes are accurate as of: 17 11:59 PM.  If you have any questions, ask your nurse or doctor.               These medicines have changed or have updated prescriptions.        Dose/Directions    allopurinol 100 MG tablet   Commonly known as:  ZYLOPRIM   This may have changed:  how much to take   Used for:  Idiopathic chronic gout of multiple sites without tophus        Dose:  200 mg   Take 2 tablets (200 mg) by mouth daily   Quantity:  60 tablet   Refills:  1                Primary Care Provider Office Phone # Fax #    Luca Espinosa [phone]       [facility] [address] [facility] [address]        Equal Access to Services     [facility]: Hadii austyn andujar Soneelima, waaxda luqadaha, qaybta kaalmada kati valdovinos. So [facility] [phone].    ATENCIÓN: Si habla español, tiene a mccarthy disposición servicios gratuitos de asistencia lingüística. Llame al [phone].    We comply with applicable federal civil rights laws and [place] laws. We do not discriminate on the basis of race, color, national origin, age, disability sex, sexual " orientation or gender identity.            Thank you!     Thank you for choosing [facility] SOLID ORGAN TRANSPLANT  for your care. Our goal is always to provide you with excellent care. Hearing back from our patients is one way we can continue to improve our services. Please take a few minutes to complete the written survey that you may receive in the mail after your visit with us. Thank you!             Your Updated Medication List - Protect others around you: Learn how to safely use, store and throw away your medicines at www.disposemymeds.org.          This list is accurate as of: 6/22/17 11:59 PM.  Always use your most recent med list.                   Brand Name Dispense Instructions for use Diagnosis    allopurinol 100 MG tablet    ZYLOPRIM    60 tablet    Take 2 tablets (200 mg) by mouth daily    Idiopathic chronic gout of multiple sites without tophus       AMLODIPINE BESYLATE PO      Take 5 mg by mouth daily        fentaNYL 25 mcg/hr 72 hr patch    DURAGESIC     Place 1 patch onto the skin every 72 hours        FUROSEMIDE PO      Take 40 mg by mouth daily        HYDROCHLOROTHIAZIDE PO      Take 25 mg by mouth daily        hydroxychloroquine 200 MG tablet    PLAQUENIL    60 tablet    Take 2 tablets (400 mg) by mouth daily    Inflammatory polyarthropathy (H)       lactulose 10 GM/15ML solution    GENERLAC    3000 mL    Take 45 mLs (30 g) by mouth 2 times daily    Cirrhosis of liver with ascites, unspecified hepatic cirrhosis type (H), Hepatocellular carcinoma (H), Hepatic encephalopathy (H)       LISINOPRIL PO      Take 20 mg by mouth daily        OXYCODONE HCL PO      Take 10 mg by mouth every 6 hours as needed        * predniSONE 20 MG tablet    DELTASONE     Take two tablets by mouth for 3 days, then 1 tablet by mouth for 3 days, then 1/2 tablets by mouth 4 days.        * predniSONE 20 MG tablet    DELTASONE    16 tablet    For gout flare only: 60mg daily x2days, then 40mg daily x5days, then stop.     Idiopathic chronic gout of multiple sites without tophus       propranolol 20 MG tablet    INDERAL     Take 20 mg by mouth        * tamsulosin 0.4 MG capsule    FLOMAX    60 capsule    Take 1 capsule (0.4 mg) by mouth daily    Benign non-nodular prostatic hyperplasia with lower urinary tract symptoms       * tamsulosin 0.4 MG capsule    FLOMAX    90 capsule    Take 1 capsule (0.4 mg) by mouth daily    BPH (benign prostatic hyperplasia)       TYLENOL 325 MG tablet   Generic drug:  acetaminophen      Take 325 mg by mouth        XIFAXAN PO      Take 550 mg by mouth 2 times daily        * Notice:  This list has 4 medication(s) that are the same as other medications prescribed for you. Read the directions carefully, and ask your doctor or other care provider to review them with you.

## 2017-06-23 ENCOUNTER — DOCUMENTATION ONLY (OUTPATIENT)
Dept: TRANSPLANT | Facility: CLINIC | Age: 67
End: 2017-06-23

## 2017-06-24 LAB
CCP AB SER IA-ACNC: 1 U/ML
ETHYL GLUCURONIDE UR QL: NORMAL

## 2017-06-28 NOTE — PROGRESS NOTES
Rheumatology team: Please call to notify Mr. Corona that his recent labs showed mild but stable anemia and thrombocytopenia (low platelets) when compared to previous labs 8 months ago. ESR (inflammatory marker) is slightly elevated.      Ray Hagen MD  6/28/2017 5:36 AM

## 2017-07-05 ENCOUNTER — TELEPHONE (OUTPATIENT)
Dept: TRANSPLANT | Facility: CLINIC | Age: 67
End: 2017-07-05

## 2017-07-05 NOTE — TELEPHONE ENCOUNTER
"Social Work: Psychosocial     D: Shun is listed for a Liver Transplant with a MELD score of 28.  I: I called patient for psychosocial f/u and to remind him to provide  Health with a copy of his health care direcitve.  Emailed patient my contact information so he or his sister Nickie can mail/fax or email his Health Care Directive to me.  A: Shun reports concerns about post transplant caregiving support.  He previously had stated his wife Susu Raymond and two sisters, Astrid and Nickie, would provide post transplant caregiving though today he is unsure about any of these options.  He reports his wife Susu Raymond has a hectic work schedule with out of town travel requirements which would not allow for her to provide much, if any assistance.  He is unsure about having his sister Astrid stay with him because she has back problems and she smokes cigarettes, and Nickie lives further away and \"has her own life.\"  Shun has difficulty relying on and asking for assistance from his support system.  He is considering asking a friend Ashanti to provide paid caregiving.  P: Patient will evaluate his caregiver options and plan.  I have offered to contact potential caregiver(s) to provided education.  I will f/u with patient in a couple of weeks.    ADILENE Lombardi, [facility]  Liver Transplant   Phone [phone]  Pager [phone]     Addendum on 7/18/17: I called patient to f/u on our discussion about post transplant caregiving.  He reports his sister Astrid's friend Conchita Hager, who he also has known since childhood, will be his primary caregiver for his post transplant needs.  He came me verbal permission to contact her at [phone].  Shun also gave me permission to contact his sister Nickie to obtain a copy of his Health Care Directive.    7/19/17: Left a voice message for Nickie to contact me regarding Shun's Health Care Directive.  7/24/17: I contacted Conchita Hager and provided education about the Caregiver Agreement " for Liver Transplant.  We also discussed skilled home care visits and criteria for [facility].  I encouraged her to visit [employer] [employer] for additional post transplant education and attend Liver Transplant Support Group as she is able.  Conchita verbalizes her commitment to providing post transplant caregiving which includes coming to the hospital for education, attending post transplant follow up appointments with patient, assisting with his medications and communicating with the transplant team.  She reports Shun will stay at her home in [place], [place] for as long as recommended.  She lives with her family (, son/daughter-in-law, three teenage grandchildren) which is just a few minutes away from patient's residence.  Conchita reports she has provided caregiving for other family and friends in the past and is more than willing to help care for Shun so his wife can continue working.  I encouraged Conchita to call me with any follow up questions and my contact information was provided.

## 2017-07-11 ENCOUNTER — COMMITTEE REVIEW (OUTPATIENT)
Dept: TRANSPLANT | Facility: CLINIC | Age: 67
End: 2017-07-11

## 2017-07-11 NOTE — COMMITTEE REVIEW
Abdominal Committee Review Note     Evaluation Date: 3/8/2016  Committee Review Date: 7/11/2017    Organ being evaluated for: Liver    Transplant Phase: Waitlist  Transplant Status: Active    Transplant Coordinator: Bartolome Benites Jr.  Transplant Surgeon:   David Mcgee    Referring Physician: Lisseth Savage    Primary Diagnosis: Primary Liver Malignancy: Hepatoma, Hepatocellular Carcinoma (HCC)  Secondary Diagnosis: Cirrhosis: Type C    Committee Review Members:  Nutrition Shweta Carter, RD   Pharmacy Geovanny Mendez, [facility]    - Clinical Lisset Quiroga, [facility]   Transplant Lizett Genao MD, Rosa Acharya, RN, Sandra Cain MD, Mel Bryant, RN, Paul Cadena MD, Jr Bartolome Benites RN, Emil Vela MD, Trace Nelson MD, Driss Aguilar MD, Hunter Schilling MD       Transplant Eligibility: Cirrhosis with MELD, HCC, HCV    Committee Review Decision: remain active    Relative Contraindications: none    Absolute Contraindications: none     Committee Chair Paul Cadena MD verbally attested to the committee's decision.    Committee Discussion Details: remain active    - SW to report back on post plan update  - cards consult asap

## 2017-07-17 ENCOUNTER — PRE VISIT (OUTPATIENT)
Dept: CARDIOLOGY | Facility: CLINIC | Age: 67
End: 2017-07-17

## 2017-07-17 ENCOUNTER — TRANSFERRED RECORDS (OUTPATIENT)
Dept: HEALTH INFORMATION MANAGEMENT | Facility: CLINIC | Age: 67
End: 2017-07-17

## 2017-07-17 DIAGNOSIS — Z01.818 ENCOUNTER FOR PRE-TRANSPLANT EVALUATION FOR LIVER TRANSPLANT: Primary | ICD-10-CM

## 2017-07-17 NOTE — TELEPHONE ENCOUNTER
Previsit nursing summary    HPI: Mr. Corona is a 65-year-old male with a history of tobacco use, hepatitis C, HCC, cirrhosis, HTN who was seen for a pre-liver transplant evaluation. He denies any history of coronary artery disease or myocardial infarction. He denies any cardiac hospitalizations. He had a normal dobutamine stress echo in March 2016. He denies any symptoms of angina, exertional dyspnea, orthopnea, PND, pedal edema, near-syncope or syncope.    Procedures:    Dobutamine stress ECHO (6/22/2017)  Interpretation Summary  Normal dobutamine stress echocardiogram at target heartrate.  No symptoms during stress.  Normal stress EKG.  Normal BP response to stress.  No significant valvular abnormality.    Dobutamine stress ECHO (3/10/2016)  Interpretation Summary  Normal dobutamine echo     No angina elicited. Rate pressure product was adequate. There were no wall  motion abnormalities at rest or stress. LVEF eliud from 60 to 70% and LV size  decreased.

## 2017-07-20 ENCOUNTER — TELEPHONE (OUTPATIENT)
Dept: TRANSPLANT | Facility: CLINIC | Age: 67
End: 2017-07-20

## 2017-07-20 ENCOUNTER — ORGAN (OUTPATIENT)
Dept: TRANSPLANT | Facility: CLINIC | Age: 67
End: 2017-07-20

## 2017-07-20 DIAGNOSIS — Z53.9 ERRONEOUS ENCOUNTER--DISREGARD: Primary | ICD-10-CM

## 2017-07-20 NOTE — TELEPHONE ENCOUNTER
Organ Offer Encounter Information    Organ Offer Information   Organ offer date & time:  7/20/2017 12:52 PM   Organ(s):   Organ UNOS ID Match Run ID Comment Organ Laterality   Liver [id number]           Recent infections?:  No    New medications?:  Yes (Comment: Prednisone, plaquenil) Recent pregnancy?:  (Comment: NA)   Angicoagulation medications?:  No Recent vaccinations?:  No   Recent blood transfusions?:  No Recent hospitalizations?:  No   Has your insurance changed in the last 6-12 months?:  Neg    Understood donor criteria, verbalized understanding   Patient/Other asked to speak to a surgeon?:  No   Discussed program-specific outcomes:  Did not have questions regarding SRTR   Right to decline organ offer without penalty, Patient/Other:  Aware of option to decline without penalty   Organ offer decision status Patient/Other:  Accepted Offer   Organ disposition:  Case Cancelled - Recipient medical condition   Additional Comments:  12:52 PM  July 20, 2017  Called Mr. Croona re liver transplant. No answer, left  requesting immediate call back.    12:57 PM   July 20, 2017  Called Mr. Corona.  No answer, no VM    1:06 PM  Called Mr. Corona.  No answer again    1:10 PM  Called Mrs. Corona's mobile phone #.  No answer, left VM requesting immediate call back.    1:25 PM  Called Dense Stephany, Mr. Corona's sister.  She immediately returned my call and we did a 3-way with Mr. Corona. He will proceed to [facility] for admission, however has concerns re recent weight gain and increased risk for surgery.  I did notify Dr. Cain re concerns.  Will discuss at time of admission. Will arrive about 3:00 PM  Admissions: Done- [facility]   Unit: Done-[facility]   Immunology: Pending   OR: Pending   Blood Bank: Pending   Research: Consented for PROTECT OCS   TransNet/ABO Verification: Pending   Add Organ: Pending  Tatum Roman RN, CCTC  On Call Organ Coordinator    5:40 PM  July 20, 2017  Per Dr. Cain, Mr. Corona has declined this offer.  See  notes in admission.  Tatum Roman, RN, CCTC  On Call Organ Coordinator       Attestation I have discussed all of the above with the Patient/Legal Guardian/Caregiver regarding this organ offer.:  Yes

## 2017-07-21 ENCOUNTER — DOCUMENTATION ONLY (OUTPATIENT)
Dept: TRANSPLANT | Facility: CLINIC | Age: 67
End: 2017-07-21

## 2017-07-21 DIAGNOSIS — K74.60 CIRRHOSIS OF LIVER WITH ASCITES, UNSPECIFIED HEPATIC CIRRHOSIS TYPE (H): ICD-10-CM

## 2017-07-21 DIAGNOSIS — R18.8 CIRRHOSIS OF LIVER WITH ASCITES, UNSPECIFIED HEPATIC CIRRHOSIS TYPE (H): ICD-10-CM

## 2017-07-21 DIAGNOSIS — C22.0 HEPATOCELLULAR CARCINOMA (H): Primary | ICD-10-CM

## 2017-07-21 NOTE — PROGRESS NOTES
7/17/17 MR from [facility]  1.) several OPTN 3s  2.) stable transplant changes    Recs:   - 3 month follow up  - outside read

## 2017-07-24 ENCOUNTER — HOSPITAL ENCOUNTER (INPATIENT)
Dept: GENERAL RADIOLOGY | Facility: CLINIC | Age: 67
End: 2017-07-24
Attending: INTERNAL MEDICINE

## 2017-07-24 DIAGNOSIS — C22.0 HEPATOCELLULAR CARCINOMA (H): ICD-10-CM

## 2017-07-24 DIAGNOSIS — R18.8 CIRRHOSIS OF LIVER WITH ASCITES, UNSPECIFIED HEPATIC CIRRHOSIS TYPE (H): ICD-10-CM

## 2017-07-24 DIAGNOSIS — K74.60 CIRRHOSIS OF LIVER WITH ASCITES, UNSPECIFIED HEPATIC CIRRHOSIS TYPE (H): ICD-10-CM

## 2017-07-25 ENCOUNTER — DOCUMENTATION ONLY (OUTPATIENT)
Dept: TRANSPLANT | Facility: CLINIC | Age: 67
End: 2017-07-25

## 2017-07-25 NOTE — TELEPHONE ENCOUNTER
"Received voice message from patient who reports he found his \"living will.\"  I attempted to reach Shun but was unable.  I left a voice message with my availability and fax/mailing address for him to send a copy of his advanced care directive.        ADILENE Lombardi, [facility]  Liver Transplant   Phone [phone]  Pager [phone]   "

## 2017-07-31 ENCOUNTER — PRE VISIT (OUTPATIENT)
Dept: CARDIOLOGY | Facility: CLINIC | Age: 67
End: 2017-07-31

## 2017-07-31 ENCOUNTER — OFFICE VISIT (OUTPATIENT)
Dept: CARDIOLOGY | Facility: CLINIC | Age: 67
End: 2017-07-31
Attending: PHYSICIAN ASSISTANT
Payer: COMMERCIAL

## 2017-07-31 VITALS
DIASTOLIC BLOOD PRESSURE: 74 MMHG | HEART RATE: 87 BPM | OXYGEN SATURATION: 92 % | HEIGHT: 69 IN | WEIGHT: 279.9 LBS | SYSTOLIC BLOOD PRESSURE: 112 MMHG | BODY MASS INDEX: 41.46 KG/M2

## 2017-07-31 DIAGNOSIS — B18.2 CHRONIC HEPATITIS C WITHOUT HEPATIC COMA (H): ICD-10-CM

## 2017-07-31 DIAGNOSIS — I10 BENIGN ESSENTIAL HYPERTENSION: Primary | ICD-10-CM

## 2017-07-31 DIAGNOSIS — Z01.818 ENCOUNTER FOR PRE-TRANSPLANT EVALUATION FOR LIVER TRANSPLANT: ICD-10-CM

## 2017-07-31 PROCEDURE — 99203 OFFICE O/P NEW LOW 30 MIN: CPT | Mod: ZP | Performed by: PHYSICIAN ASSISTANT

## 2017-07-31 RX ORDER — PREDNISONE 10 MG/1
10 TABLET ORAL
COMMUNITY
Start: 2017-07-17

## 2017-07-31 ASSESSMENT — PAIN SCALES - GENERAL: PAINLEVEL: NO PAIN (0)
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
## 2017-07-31 NOTE — PATIENT INSTRUCTIONS
You were seen today in the Cardiovascular Clinic at the [facility].      Cardiology Providers you saw during your visit:  Damion BECK    Diagnosis:   Hypertension   HCC   Cirrhosis    Results:  Dobutamine stress ECHO reviewed with patient     Recommendations:    1. Encouraged patient to make healthy lifestyle choices to include a heart healthy diet   2. Encouraged patient to continue to be active as much as he can tolerate including regular exercise working on cars and walking   3. No medication changes today   4. No further cardiac workup is needed for preoperative clearance, we will give him clearance to proceed    Follow-up:  PRN       For emergencies call [phone].    For any scheduling needs, please call [phone]. Option 1 then option 3    Thank you for your visit today!     Please call if you have any questions or concerns.  Damion BECK

## 2017-07-31 NOTE — MR AVS SNAPSHOT
After Visit Summary   7/31/2017    Shun Corona    MRN: [id number]           Patient Information     Date Of Birth          1950        Visit Information        Provider Department      7/31/2017 11:30 AM Damion Greene PA-C [facility]        Today's Diagnoses     Benign essential hypertension    -  1    Encounter for pre-transplant evaluation for liver transplant        Chronic hepatitis C without hepatic coma (H)          Care Instructions    You were seen today in the Cardiovascular Clinic at the [facility].      Cardiology Providers you saw during your visit:  Damion BECK    Diagnosis:   Hypertension   HCC   Cirrhosis    Results:  Dobutamine stress ECHO reviewed with patient     Recommendations:    1. Encouraged patient to make healthy lifestyle choices to include a heart healthy diet   2. Encouraged patient to continue to be active as much as he can tolerate including regular exercise working on cars and walking   3. No medication changes today   4. No further cardiac workup is needed for preoperative clearance, we will give him clearance to proceed    Follow-up:  PRN       For emergencies call [phone].    For any scheduling needs, please call [phone]. Option 1 then option 3    Thank you for your visit today!     Please call if you have any questions or concerns.  Damion BECK             Follow-ups after your visit        Follow-up notes from your care team     See patient instructions section of the AVS Return if symptoms worsen or fail to improve, for Hypertension.      Who to contact     If you have questions or need follow up information about today's clinic visit or your schedule please contact [facility] directly at [phone].  Normal or non-critical lab and imaging results will be communicated to you by MyChart, letter or phone within 4 business days after the clinic has received the results. If you do not hear from us within 7  "days, please contact the clinic through [employer] or phone. If you have a critical or abnormal lab result, we will notify you by phone as soon as possible.  Submit refill requests through [employer] or call your pharmacy and they will forward the refill request to us. Please allow 3 business days for your refill to be completed.          Additional Information About Your Visit        [employer] Information     [employer] lets you send messages to your doctor, view your test results, renew your prescriptions, schedule appointments and more. To sign up, go to [email or web address] . Click on \"Log in\" on the left side of the screen, which will take you to the Welcome page. Then click on \"Sign up Now\" on the right side of the page.     You will be asked to enter the access code listed below, as well as some personal information. Please follow the directions to create your username and password.     Your access code is: [email or web address]  Expires: 2017 11:43 AM     Your access code will  in 90 days. If you need help or a new code, please call your [place] clinic or [phone].        Care EveryWhere ID     This is your Care EveryWhere ID. This could be used by other organizations to access your [place] medical records  [id number]        Your Vitals Were     Pulse Height Pulse Oximetry BMI (Body Mass Index)          87 1.753 m (5' 9\") 92% 41.33 kg/m2         Blood Pressure from Last 3 Encounters:   17 112/74   17 152/88   17 (!) 176/95    Weight from Last 3 Encounters:   17 127 kg (279 lb 14.4 oz)   17 129.5 kg (285 lb 7.9 oz)   17 129.5 kg (285 lb 9.6 oz)              Today, you had the following     No orders found for display         Today's Medication Changes          These changes are accurate as of: 17 12:29 PM.  If you have any questions, ask your nurse or doctor.               These medicines have changed or have updated prescriptions.        Dose/Directions    " allopurinol 100 MG tablet   Commonly known as:  ZYLOPRIM   This may have changed:  how much to take   Used for:  Idiopathic chronic gout of multiple sites without tophus        Dose:  200 mg   Take 2 tablets (200 mg) by mouth daily   Quantity:  60 tablet   Refills:  1       predniSONE 10 MG tablet   Commonly known as:  DELTASONE   This may have changed:  Another medication with the same name was removed. Continue taking this medication, and follow the directions you see here.   Changed by:  Damion Greene PA-C        Dose:  10 mg   Take 10 mg by mouth   Refills:  0                Primary Care Provider Office Phone # Fax #    Luca Espinosa [phone]       [facility] [address]  [facility] [address]        Equal Access to Services     ZAYRA PELAYO : Amy de la fuente hadbetoo Soneelima, waaxda luqadaha, qaybta kaalmada adeegyada, kati frost . So [facility] [phone].    ATENCIÓN: Si habla español, tiene a mccarthy disposición servicios gratuitos de asistencia lingüística. [facility] [phone].    We comply with applicable federal civil rights laws and [place] laws. We do not discriminate on the basis of race, color, national origin, age, disability sex, sexual orientation or gender identity.            Thank you!     Thank you for choosing [facility]  for your care. Our goal is always to provide you with excellent care. Hearing back from our patients is one way we can continue to improve our services. Please take a few minutes to complete the written survey that you may receive in the mail after your visit with us. Thank you!             Your Updated Medication List - Protect others around you: Learn how to safely use, store and throw away your medicines at www.disposemymeds.org.          This list is accurate as of: 7/31/17 12:29 PM.  Always use your most recent med list.                   Brand Name Dispense Instructions for use Diagnosis    allopurinol 100 MG  tablet    ZYLOPRIM    60 tablet    Take 2 tablets (200 mg) by mouth daily    Idiopathic chronic gout of multiple sites without tophus       AMLODIPINE BESYLATE PO      Take 10 mg by mouth daily        fentaNYL 25 mcg/hr 72 hr patch    DURAGESIC     Place 1 patch onto the skin every 72 hours        FUROSEMIDE PO      Take 40 mg by mouth daily        HYDROCHLOROTHIAZIDE PO      Take 25 mg by mouth daily        hydroxychloroquine 200 MG tablet    PLAQUENIL    60 tablet    Take 2 tablets (400 mg) by mouth daily    Inflammatory polyarthropathy (H)       lactulose 10 GM/15ML solution    GENERLAC    3000 mL    Take 45 mLs (30 g) by mouth 2 times daily    Cirrhosis of liver with ascites, unspecified hepatic cirrhosis type (H), Hepatocellular carcinoma (H), Hepatic encephalopathy (H)       LISINOPRIL PO      Take 20 mg by mouth daily        OXYCODONE HCL PO      Take 10 mg by mouth every 6 hours as needed        predniSONE 10 MG tablet    DELTASONE     Take 10 mg by mouth        propranolol 20 MG tablet    INDERAL     Take 20 mg by mouth        * tamsulosin 0.4 MG capsule    FLOMAX    60 capsule    Take 1 capsule (0.4 mg) by mouth daily    Benign non-nodular prostatic hyperplasia with lower urinary tract symptoms       * tamsulosin 0.4 MG capsule    FLOMAX    90 capsule    Take 1 capsule (0.4 mg) by mouth daily    BPH (benign prostatic hyperplasia)       TYLENOL 325 MG tablet   Generic drug:  acetaminophen      Take 325 mg by mouth        XIFAXAN PO      Take 550 mg by mouth 2 times daily        * Notice:  This list has 2 medication(s) that are the same as other medications prescribed for you. Read the directions carefully, and ask your doctor or other care provider to review them with you.

## 2017-07-31 NOTE — NURSING NOTE
Chief Complaint   Patient presents with     Follow Up For     65 y/o male for liver transplant evaluation.  EKG prior.     Vitals were taken and medications were reconciled.     Boo Diaz MA  11:56 AM

## 2017-07-31 NOTE — TELEPHONE ENCOUNTER
EKG : 7/31/17  Prior to appt.    Dobutamine stress echo : 6/22/17  Normal dobutamine stress echocardiogram at target heartrate.  No symptoms during stress.  Normal stress EKG.  Normal BP response to stress.  No significant valvular abnormality.
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
## 2017-08-17 ENCOUNTER — DOCUMENTATION ONLY (OUTPATIENT)
Dept: TRANSPLANT | Facility: CLINIC | Age: 67
End: 2017-08-17

## 2017-08-17 NOTE — PROGRESS NOTES
Per conversation with Dr. Lacy patient to be placed on hold as he refused offer stating he has not lost enough weight.    Will discuss at next selection conference, but placed on hold now given he is the top of list for his ABO

## 2017-08-17 NOTE — LETTER
Shun Corona  [address]    [place] [address]    August 17, 2017    Dear Shun    This letter is being sent to notify you that your status was changed to inactive on the liver transplant waiting list at the [facility] [place], on 8/17/17.  Your status was changed because of your elevated BMI, your need to lose some weight, and your need to solidify the after transplant care team.    We will reconsider you for reactivation on the transplant list when your situation changes.  We would like the opportunity to discuss this decision with you.  If you are not scheduled for a return visit with your provider here, please call [phone] or [phone] to make an appointment.  We recommend that you continue to follow with your care providers for continued management of your liver disease.    Our program has physician and surgeon coverage 24 hours a day, 365 days a year.  If this coverage changes or there are substantial program changes, you will be notified in writing by letter.    You have been sent this letter to comply with the [employer] ([employer]) guidelines.    Finally, attached is a letter from the [employer] ([employer]).  It describes the services and information offered to patients by [employer] and the [employer].    We appreciate having had the opportunity to participate in your care.  If you have questions, please feel free to call the Transplant Office at [phone] or call [phone] to schedule an appointment.    Sincerely,    Bartolome Benites Jr., MADELINEN, RN  Liver Transplant Coordinator  [phone]    Encl: [employer] Letter

## 2017-08-22 DIAGNOSIS — M06.4 INFLAMMATORY POLYARTHROPATHY (H): ICD-10-CM

## 2017-08-22 RX ORDER — HYDROXYCHLOROQUINE SULFATE 200 MG/1
400 TABLET, FILM COATED ORAL DAILY
Qty: 60 TABLET | Refills: 1 | Status: CANCELLED | OUTPATIENT
Start: 2017-08-22

## 2017-08-22 NOTE — TELEPHONE ENCOUNTER
hydroxychloroquine (PLAQUENIL) 200 MG tablet      Last Written Prescription Date:  06/20/17  Last Fill Quantity: 60,   # refills: 1  [place] [facility] or [facility] prescribing provider: 06/20/17  Future Office visit:       Routing refill request to provider for review/approval because:  Drug not on the [facility], [facility] or [facility] refill protocol or controlled substance      [facility]

## 2017-08-24 ENCOUNTER — TELEPHONE (OUTPATIENT)
Dept: RHEUMATOLOGY | Facility: CLINIC | Age: 67
End: 2017-08-24

## 2017-08-24 NOTE — TELEPHONE ENCOUNTER
Patient needs a follow up appointment and labs done before plaquenil can be refilled, please call patient and schedule appointment.  Erika Matos CMA  8/24/2017 3:22 PM

## 2017-08-24 NOTE — TELEPHONE ENCOUNTER
Refill request denied, patient needs a follow up appointment. Will call patient.  Erika Matos CMA  8/24/2017 3:23 PM

## 2017-08-29 DIAGNOSIS — M1A.09X0 IDIOPATHIC CHRONIC GOUT OF MULTIPLE SITES WITHOUT TOPHUS: ICD-10-CM

## 2017-08-29 RX ORDER — ALLOPURINOL 100 MG/1
200 TABLET ORAL DAILY
Qty: 60 TABLET | Refills: 1 | Status: CANCELLED | OUTPATIENT
Start: 2017-08-29

## 2017-08-29 NOTE — TELEPHONE ENCOUNTER
Called and made a follow up appointment for 9/5/17, called pharmacy to inform them prescription will not be refilled until patient is seen.  Erika Matos CMA  8/29/2017 2:56 PM

## 2017-08-29 NOTE — TELEPHONE ENCOUNTER
allopurinol (ZYLOPRIM) 100 MG tablet       Last Written Prescription Date: 06/20/17  Last Fill Quantity: 60, # refills: 1  Last Office Visit with [facility], P or [facility] prescribing provider:  06/20/17        Uric Acid   Date Value Ref Range Status   06/20/2017 5.2 3.5 - 7.2 mg/dL Final   ]  Creatinine   Date Value Ref Range Status   06/22/2017 0.99 0.66 - 1.25 mg/dL Final   ]  Lab Results   Component Value Date    WBC 5.6 06/20/2017     Lab Results   Component Value Date    RBC 4.45 06/20/2017     Lab Results   Component Value Date    HGB 12.4 06/20/2017     Lab Results   Component Value Date    HCT 36.2 06/20/2017     No components found for: MCT  Lab Results   Component Value Date    MCV 81 06/20/2017     Lab Results   Component Value Date    MCH 27.9 06/20/2017     Lab Results   Component Value Date    MCHC 34.3 06/20/2017     Lab Results   Component Value Date    RDW 14.0 06/20/2017     Lab Results   Component Value Date     06/20/2017     Lab Results   Component Value Date    AST 29 06/20/2017     Lab Results   Component Value Date    ALT 40 06/20/2017         [facility]

## 2017-09-05 ENCOUNTER — OFFICE VISIT (OUTPATIENT)
Dept: RHEUMATOLOGY | Facility: CLINIC | Age: 67
End: 2017-09-05
Payer: COMMERCIAL

## 2017-09-05 VITALS
BODY MASS INDEX: 41.91 KG/M2 | DIASTOLIC BLOOD PRESSURE: 82 MMHG | OXYGEN SATURATION: 93 % | WEIGHT: 283.8 LBS | HEART RATE: 104 BPM | SYSTOLIC BLOOD PRESSURE: 152 MMHG

## 2017-09-05 DIAGNOSIS — M1A.09X0 IDIOPATHIC CHRONIC GOUT OF MULTIPLE SITES WITHOUT TOPHUS: ICD-10-CM

## 2017-09-05 DIAGNOSIS — M06.4 INFLAMMATORY POLYARTHROPATHY (H): Primary | ICD-10-CM

## 2017-09-05 LAB
ALBUMIN SERPL-MCNC: 3.2 G/DL (ref 3.4–5)
ALP SERPL-CCNC: 132 U/L (ref 40–150)
ALT SERPL W P-5'-P-CCNC: 43 U/L (ref 0–70)
AST SERPL W P-5'-P-CCNC: 28 U/L (ref 0–45)
BASOPHILS # BLD AUTO: 0 10E9/L (ref 0–0.2)
BASOPHILS NFR BLD AUTO: 0.2 %
BILIRUB DIRECT SERPL-MCNC: 0.1 MG/DL (ref 0–0.2)
BILIRUB SERPL-MCNC: 0.4 MG/DL (ref 0.2–1.3)
CREAT SERPL-MCNC: 0.97 MG/DL (ref 0.66–1.25)
DIFFERENTIAL METHOD BLD: ABNORMAL
EOSINOPHIL # BLD AUTO: 0.1 10E9/L (ref 0–0.7)
EOSINOPHIL NFR BLD AUTO: 1.1 %
ERYTHROCYTE [DISTWIDTH] IN BLOOD BY AUTOMATED COUNT: 14.9 % (ref 10–15)
GFR SERPL CREATININE-BSD FRML MDRD: 77 ML/MIN/1.7M2
HCT VFR BLD AUTO: 37.7 % (ref 40–53)
HGB BLD-MCNC: 13.2 G/DL (ref 13.3–17.7)
LYMPHOCYTES # BLD AUTO: 0.4 10E9/L (ref 0.8–5.3)
LYMPHOCYTES NFR BLD AUTO: 7.2 %
MCH RBC QN AUTO: 28.5 PG (ref 26.5–33)
MCHC RBC AUTO-ENTMCNC: 35 G/DL (ref 31.5–36.5)
MCV RBC AUTO: 81 FL (ref 78–100)
MONOCYTES # BLD AUTO: 0.1 10E9/L (ref 0–1.3)
MONOCYTES NFR BLD AUTO: 2 %
NEUTROPHILS # BLD AUTO: 4.8 10E9/L (ref 1.6–8.3)
NEUTROPHILS NFR BLD AUTO: 89.5 %
PLATELET # BLD AUTO: 125 10E9/L (ref 150–450)
PROT SERPL-MCNC: 7 G/DL (ref 6.8–8.8)
RBC # BLD AUTO: 4.63 10E12/L (ref 4.4–5.9)
URATE SERPL-MCNC: 3.5 MG/DL (ref 3.5–7.2)
WBC # BLD AUTO: 5.4 10E9/L (ref 4–11)

## 2017-09-05 PROCEDURE — 99213 OFFICE O/P EST LOW 20 MIN: CPT | Performed by: INTERNAL MEDICINE

## 2017-09-05 PROCEDURE — 82565 ASSAY OF CREATININE: CPT | Performed by: INTERNAL MEDICINE

## 2017-09-05 PROCEDURE — 36415 COLL VENOUS BLD VENIPUNCTURE: CPT | Performed by: INTERNAL MEDICINE

## 2017-09-05 PROCEDURE — 84550 ASSAY OF BLOOD/URIC ACID: CPT | Performed by: INTERNAL MEDICINE

## 2017-09-05 PROCEDURE — 80076 HEPATIC FUNCTION PANEL: CPT | Performed by: INTERNAL MEDICINE

## 2017-09-05 PROCEDURE — 85025 COMPLETE CBC W/AUTO DIFF WBC: CPT | Performed by: INTERNAL MEDICINE

## 2017-09-05 RX ORDER — HYDROXYCHLOROQUINE SULFATE 200 MG/1
200 TABLET, FILM COATED ORAL 2 TIMES DAILY
Qty: 180 TABLET | Refills: 1 | Status: SHIPPED | OUTPATIENT
Start: 2017-09-05

## 2017-09-05 RX ORDER — ALLOPURINOL 100 MG/1
200 TABLET ORAL DAILY
Qty: 180 TABLET | Refills: 1 | Status: SHIPPED | OUTPATIENT
Start: 2017-09-05
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
## 2017-09-05 NOTE — LETTER
[facility]  [address] [place] [address]. [place] [address]    September 6, 2017    Shun Corona  [address]  [place] [address]      Dear Shun,    Labs are stable.  Uric acid is at goal.     Enclosed is a copy of your results.   Results for orders placed or performed in visit on 09/05/17   CBC with platelets differential   Result Value Ref Range    WBC 5.4 4.0 - 11.0 10e9/L    RBC Count 4.63 4.4 - 5.9 10e12/L    Hemoglobin 13.2 (L) 13.3 - 17.7 g/dL    Hematocrit 37.7 (L) 40.0 - 53.0 %    MCV 81 78 - 100 fl    MCH 28.5 26.5 - 33.0 pg    MCHC 35.0 31.5 - 36.5 g/dL    RDW 14.9 10.0 - 15.0 %    Platelet Count 125 (L) 150 - 450 10e9/L    Diff Method Automated Method     % Neutrophils 89.5 %    % Lymphocytes 7.2 %    % Monocytes 2.0 %    % Eosinophils 1.1 %    % Basophils 0.2 %    Absolute Neutrophil 4.8 1.6 - 8.3 10e9/L    Absolute Lymphocytes 0.4 (L) 0.8 - 5.3 10e9/L    Absolute Monocytes 0.1 0.0 - 1.3 10e9/L    Absolute Eosinophils 0.1 0.0 - 0.7 10e9/L    Absolute Basophils 0.0 0.0 - 0.2 10e9/L   Hepatic panel   Result Value Ref Range    Bilirubin Direct 0.1 0.0 - 0.2 mg/dL    Bilirubin Total 0.4 0.2 - 1.3 mg/dL    Albumin 3.2 (L) 3.4 - 5.0 g/dL    Protein Total 7.0 6.8 - 8.8 g/dL    Alkaline Phosphatase 132 40 - 150 U/L    ALT 43 0 - 70 U/L    AST 28 0 - 45 U/L   Creatinine   Result Value Ref Range    Creatinine 0.97 0.66 - 1.25 mg/dL    GFR Estimate 77 >60 mL/min/1.7m2    GFR Estimate If Black >90 >60 mL/min/1.7m2   Uric acid   Result Value Ref Range    Uric Acid 3.5 3.5 - 7.2 mg/dL       If you have any questions or concerns, please call myself or my nurse at [phone].    Sincerely,    Ray Hagen MD/pramod

## 2017-09-05 NOTE — MR AVS SNAPSHOT
"              After Visit Summary   9/5/2017    Shun Corona    MRN: [id number]           Patient Information     Date Of Birth          1950        Visit Information        Provider Department      9/5/2017 11:40 AM Ray Hagen MD [facility]        Today's Diagnoses     Inflammatory polyarthropathy (H)    -  1    Idiopathic chronic gout of multiple sites without tophus           Follow-ups after your visit        Your next 10 appointments already scheduled     Dec 05, 2017 11:40 AM CST   Return Visit with Ray Hagen MD   [facility] ([facility])    [address] [place] [id number] [phone]              Who to contact     If you have questions or need follow up information about today's clinic visit or your schedule please contact [facility] directly at [phone].  Normal or non-critical lab and imaging results will be communicated to you by MyChart, letter or phone within 4 business days after the clinic has received the results. If you do not hear from us within 7 days, please contact the clinic through MyChart or phone. If you have a critical or abnormal lab result, we will notify you by phone as soon as possible.  Submit refill requests through [employer] or call your pharmacy and they will forward the refill request to us. Please allow 3 business days for your refill to be completed.          Additional Information About Your Visit        MyChart Information     [employer] lets you send messages to your doctor, view your test results, renew your prescriptions, schedule appointments and more. To sign up, go to [email or web address] . Click on \"Log in\" on the left side of the screen, which will take you to the Welcome page. Then click on \"Sign up Now\" on the right side of the page.     You will be asked to enter the access code listed below, as well as some personal information. Please follow the " directions to create your username and password.     Your access code is: [email or web address]  Expires: 2017 12:31 PM     Your access code will  in 90 days. If you need help or a new code, please call your [facility] or [phone].        Care EveryWhere ID     This is your Care EveryWhere ID. This could be used by other organizations to access your [place] medical records  [id number]        Your Vitals Were     Pulse Pulse Oximetry BMI (Body Mass Index)             104 93% 41.91 kg/m2          Blood Pressure from Last 3 Encounters:   17 152/82   17 112/74   17 152/88    Weight from Last 3 Encounters:   17 128.7 kg (283 lb 12.8 oz)   17 127 kg (279 lb 14.4 oz)   17 129.5 kg (285 lb 7.9 oz)              We Performed the Following     CBC with platelets differential     Creatinine     Hepatic panel     Uric acid          Today's Medication Changes          These changes are accurate as of: 17 12:31 PM.  If you have any questions, ask your nurse or doctor.               These medicines have changed or have updated prescriptions.        Dose/Directions    allopurinol 100 MG tablet   Commonly known as:  ZYLOPRIM   This may have changed:  how much to take   Used for:  Idiopathic chronic gout of multiple sites without tophus        Dose:  200 mg   Take 2 tablets (200 mg) by mouth daily   Quantity:  180 tablet   Refills:  1       hydroxychloroquine 200 MG tablet   Commonly known as:  PLAQUENIL   This may have changed:    - how much to take  - when to take this   Used for:  Inflammatory polyarthropathy (H)   Changed by:  Ray Hagen MD        Dose:  200 mg   Take 1 tablet (200 mg) by mouth 2 times daily   Quantity:  180 tablet   Refills:  1            Where to get your medicines      These medications were sent to [id number] - [place] -  [facility] AT [facility] & [facility]  [address] [facility] [address]     Phone:  [phone]      allopurinol 100 MG tablet    hydroxychloroquine 200 MG tablet                Primary Care Provider Office Phone # Fax #    Luca CHAKRABORTY Jesús [phone]       [facility] [address]  [facility] [address]        Equal Access to Services     STACY PELAYO : Hadvern de la fuente hadbetoo Soomaali, waaxda luqadaha, qaybta kaalmada adeegyada, waxmattie dawoodin haykennyn shaniamando christensen margot white. So [facility] [phone].    ATENCIÓN: Si habla español, tiene a mccarthy disposición servicios gratuitos de asistencia lingüística. Llame al [phone].    We comply with applicable federal civil rights laws and [place] laws. We do not discriminate on the basis of race, color, national origin, age, disability sex, sexual orientation or gender identity.            Thank you!     Thank you for choosing [facility]  for your care. Our goal is always to provide you with excellent care. Hearing back from our patients is one way we can continue to improve our services. Please take a few minutes to complete the written survey that you may receive in the mail after your visit with us. Thank you!             Your Updated Medication List - Protect others around you: Learn how to safely use, store and throw away your medicines at www.disposemymeds.org.          This list is accurate as of: 9/5/17 12:31 PM.  Always use your most recent med list.                   Brand Name Dispense Instructions for use Diagnosis    allopurinol 100 MG tablet    ZYLOPRIM    180 tablet    Take 2 tablets (200 mg) by mouth daily    Idiopathic chronic gout of multiple sites without tophus       AMLODIPINE BESYLATE PO      Take 10 mg by mouth daily        fentaNYL 25 mcg/hr 72 hr patch    DURAGESIC     Place 1 patch onto the skin every 72 hours        FUROSEMIDE PO      Take 40 mg by mouth daily        HYDROCHLOROTHIAZIDE PO      Take 25 mg by mouth daily        hydroxychloroquine 200 MG tablet    PLAQUENIL    180 tablet    Take 1 tablet (200 mg) by mouth  2 times daily    Inflammatory polyarthropathy (H)       lactulose 10 GM/15ML solution    GENERLAC    3000 mL    Take 45 mLs (30 g) by mouth 2 times daily    Cirrhosis of liver with ascites, unspecified hepatic cirrhosis type (H), Hepatocellular carcinoma (H), Hepatic encephalopathy (H)       LISINOPRIL PO      Take 20 mg by mouth daily        OXYCODONE HCL PO      Take 10 mg by mouth every 6 hours as needed        predniSONE 10 MG tablet    DELTASONE     Take 10 mg by mouth        propranolol 20 MG tablet    INDERAL     Take 20 mg by mouth        * tamsulosin 0.4 MG capsule    FLOMAX    60 capsule    Take 1 capsule (0.4 mg) by mouth daily    Benign non-nodular prostatic hyperplasia with lower urinary tract symptoms       * tamsulosin 0.4 MG capsule    FLOMAX    90 capsule    Take 1 capsule (0.4 mg) by mouth daily    BPH (benign prostatic hyperplasia)       TYLENOL 325 MG tablet   Generic drug:  acetaminophen      Take 325 mg by mouth        XIFAXAN PO      Take 550 mg by mouth 2 times daily        * Notice:  This list has 2 medication(s) that are the same as other medications prescribed for you. Read the directions carefully, and ask your doctor or other care provider to review them with you.

## 2017-09-05 NOTE — NURSING NOTE
Clinic notes faxed to Dr. Luca Espinosa at [facility]  Erika Matos CMA  9/5/2017 1:45 PM

## 2017-09-05 NOTE — NURSING NOTE
"Chief Complaint   Patient presents with     Arthritis     Patient states pain in his hips, shoulders, hands and feet       Initial /87 (BP Location: Left arm, Patient Position: Chair, Cuff Size: Adult Large)  Pulse 104  Wt 128.7 kg (283 lb 12.8 oz)  SpO2 93%  BMI 41.91 kg/m2 Estimated body mass index is 41.91 kg/(m^2) as calculated from the following:    Height as of 7/31/17: 1.753 m (5' 9\").    Weight as of this encounter: 128.7 kg (283 lb 12.8 oz).  BP completed using cuff size: large         RAPID3 (0-30) Cumulative Score            RAPID3 Weighted Score (divide #4 by 3 and that is the weighted score)           "

## 2017-09-05 NOTE — Clinical Note
Please fax my clinic note dated 9/5/2017 to Mr. Corona's PCP:  Dr. Luca Espinosa at [facility]  Thank you, Ray Hagen MD 9/5/2017 12:52 PM

## 2017-09-05 NOTE — NURSING NOTE
Blood pressure rechecked after visit       152/82  Erika Matos CMA  9/5/2017 12:31 PM

## 2017-09-15 ENCOUNTER — DOCUMENTATION ONLY (OUTPATIENT)
Dept: TRANSPLANT | Facility: CLINIC | Age: 67
End: 2017-09-15

## 2017-10-23 ENCOUNTER — TELEPHONE (OUTPATIENT)
Dept: TRANSPLANT | Facility: CLINIC | Age: 67
End: 2017-10-23

## 2017-10-23 NOTE — TELEPHONE ENCOUNTER
"Transplant Social Work Services Phone Call      Data: Shun was placed inactive on the transplant waitlist back in August of this year.  Patient previously stated that he found his \"living will\" but has not provided [facility] with a copy.  Intervention: Attempted to reach patient to again discuss the importance of [facility] having a copy of his advanced directive.  I encouraged him to call me.  Assessment: Further assessment and education is needed.  Education provided by SW: importance of having a valid health care directive on file  Plan: Awaiting call back from patient.          ADILENE Lombardi, [facility]  Liver Transplant   Phone [phone]  Pager [phone]   "

## 2017-10-30 ENCOUNTER — TRANSFERRED RECORDS (OUTPATIENT)
Dept: HEALTH INFORMATION MANAGEMENT | Facility: CLINIC | Age: 67
End: 2017-10-30

## 2017-11-08 ENCOUNTER — HOSPITAL ENCOUNTER (INPATIENT)
Dept: GENERAL RADIOLOGY | Facility: CLINIC | Age: 67
End: 2017-11-08
Attending: INTERNAL MEDICINE

## 2017-11-09 ENCOUNTER — TELEPHONE (OUTPATIENT)
Dept: TRANSPLANT | Facility: CLINIC | Age: 67
End: 2017-11-09

## 2017-11-09 NOTE — TELEPHONE ENCOUNTER
"Transplant Social Work Services Phone Call        Data: Patient previously stated that he found his \"living will\" but has not provided JACINTO Cuello with a copy.  Intervention: Attempted to reach patient to again discuss the importance of [facility] having a copy of his advanced directive.  I encouraged him to call me, and offered to help him complete a new directive if he cannot find his previously completed one.  Assessment: Further assessment and education is needed.  Education provided by SW: importance of having a valid health care directive on file  Plan: Awaiting call back from patient.             ADILENE Lombardi, [facility]  Liver Transplant   Phone [phone]  Pager [phone]     Addendum: Received call back from patient.  Mailed patient an addressed envelope to send his Health Care Directive back to me.  "

## 2017-11-10 ENCOUNTER — DOCUMENTATION ONLY (OUTPATIENT)
Dept: TRANSPLANT | Facility: CLINIC | Age: 67
End: 2017-11-10

## 2017-11-10 NOTE — PROGRESS NOTES
MRI done 10/30/17 reviewed at tumor conference    Review:   No active tumor    Plan:   Review at selection conference regarding re-activation.

## 2017-11-15 ENCOUNTER — DOCUMENTATION ONLY (OUTPATIENT)
Dept: TRANSPLANT | Facility: CLINIC | Age: 67
End: 2017-11-15

## 2017-11-15 NOTE — PROGRESS NOTES
Pt discussed at selection conference on 11/14/17.     -Recommendation for pt to lose additional weight and then return to see txp surgery.     Pt called and updated of above. Pt expressed understanding and confirmed he continues to make lifestyle changes to lose weight, his goal is to get weight between 240-250lbs. Pt reports that he has been having some success and continues to work at ongoing weight loss. Pt encouraged to call us with updates regarding weightloss. Pt verbalized understanding.

## 2017-12-03 NOTE — TELEPHONE ENCOUNTER
Left message for patient to call and schedule a follow up appointment.  Erika Matos CMA  8/25/2017 9:11 AM     Spine appears normal, range of motion is not limited, no muscle or joint tenderness

## 2017-12-07 NOTE — TELEPHONE ENCOUNTER
Addendum on 12/7/17: Received a copy of patient's Health Care Directive which names his sister Nickie Hammond as his primary agent and his wife Susu Corona as his alternate agent.  Copy sent to [employer] for scanning.        ADILENE Lombardi, [facility]  Liver Transplant   Phone [phone]  Pager [phone]

## 2018-01-01 ENCOUNTER — TRANSFERRED RECORDS (OUTPATIENT)
Dept: HEALTH INFORMATION MANAGEMENT | Facility: CLINIC | Age: 68
End: 2018-01-01

## 2018-01-01 ENCOUNTER — TELEPHONE (OUTPATIENT)
Dept: TRANSPLANT | Facility: CLINIC | Age: 68
End: 2018-01-01

## 2018-01-01 DIAGNOSIS — N40.0 BPH (BENIGN PROSTATIC HYPERPLASIA): ICD-10-CM

## 2018-01-01 DIAGNOSIS — K74.60 CIRRHOSIS OF LIVER WITH ASCITES, UNSPECIFIED HEPATIC CIRRHOSIS TYPE (H): ICD-10-CM

## 2018-01-01 DIAGNOSIS — K76.82 HEPATIC ENCEPHALOPATHY (H): ICD-10-CM

## 2018-01-01 DIAGNOSIS — C22.0 HEPATOCELLULAR CARCINOMA (H): ICD-10-CM

## 2018-01-01 DIAGNOSIS — M06.4 INFLAMMATORY POLYARTHROPATHY (H): ICD-10-CM

## 2018-01-01 DIAGNOSIS — R18.8 CIRRHOSIS OF LIVER WITH ASCITES, UNSPECIFIED HEPATIC CIRRHOSIS TYPE (H): ICD-10-CM

## 2018-01-01 DIAGNOSIS — M1A.09X0 IDIOPATHIC CHRONIC GOUT OF MULTIPLE SITES WITHOUT TOPHUS: ICD-10-CM

## 2018-01-01 RX ORDER — ALLOPURINOL 100 MG/1
200 TABLET ORAL DAILY
Qty: 180 TABLET | Refills: 1 | OUTPATIENT
Start: 2018-01-01

## 2018-01-01 RX ORDER — HYDROXYCHLOROQUINE SULFATE 200 MG/1
200 TABLET, FILM COATED ORAL 2 TIMES DAILY
Qty: 180 TABLET | Refills: 1 | OUTPATIENT
Start: 2018-01-01

## 2018-01-01 RX ORDER — TAMSULOSIN HYDROCHLORIDE 0.4 MG/1
CAPSULE ORAL
Qty: 90 CAPSULE | Refills: 0 | OUTPATIENT
Start: 2018-01-01

## 2018-01-01 RX ORDER — TAMSULOSIN HYDROCHLORIDE 0.4 MG/1
0.4 CAPSULE ORAL DAILY
Qty: 30 CAPSULE | Refills: 0 | OUTPATIENT
Start: 2018-01-01

## 2018-03-12 NOTE — TELEPHONE ENCOUNTER
Requested Prescriptions   Pending Prescriptions Disp Refills     allopurinol (ZYLOPRIM) 100 MG tablet 180 tablet 1     Sig: Take 2 tablets (200 mg) by mouth daily    There is no refill protocol information for this order        Last Written Prescription Date:  9/5/2017  Last Fill Quantity: 180,  # refills: 1   Last office visit: No previous visit found with prescribing provider:  Jesús Hung Office Visit:    Last Written Prescription Date:  9/5/2017  Last Fill Quantity: 180,  # refills: 1   Last office visit: No previous visit found with prescribing provider:  Jesús Hung Office Visit:

## 2018-03-12 NOTE — LETTER
Dr. Hagen   Rheumatology                                                                        4/10/2018  [address] [place] [address]        Scotty Hoffmann,    We received a refill request for your Allopurinol and Hydroxychloroquine, Dr. Hagen has denied the refill until you see him in clinic. Please call [phone] and schedule a follow up appointment.    If you have any questions please let me know. [phone].    Thank you!    Erika Matos CMA with Dr. Hagen

## 2018-03-15 NOTE — TELEPHONE ENCOUNTER
Medication:   Plaquenil  Last written on:   9/5/2017  Quantity:   180    Refills:   1    Last office visit:   9/5/2017  No show:   12/5/2017  Next office visit:   none  Last labs:   9/5/2017      Medication:  Allopurinol  Last written on:   9/5/2017  Quantity:   180   Refills:   1  Last office visit:   9/5/2017  No show:   12/5/2017  Next office visit:   none  Last labs:   9/5/2017      Office Visit on 09/05/2017   Component Date Value Ref Range Status     WBC 09/05/2017 5.4  4.0 - 11.0 10e9/L Final     RBC Count 09/05/2017 4.63  4.4 - 5.9 10e12/L Final     Hemoglobin 09/05/2017 13.2* 13.3 - 17.7 g/dL Final     Hematocrit 09/05/2017 37.7* 40.0 - 53.0 % Final     MCV 09/05/2017 81  78 - 100 fl Final     MCH 09/05/2017 28.5  26.5 - 33.0 pg Final     MCHC 09/05/2017 35.0  31.5 - 36.5 g/dL Final     RDW 09/05/2017 14.9  10.0 - 15.0 % Final     Platelet Count 09/05/2017 125* 150 - 450 10e9/L Final     Diff Method 09/05/2017 Automated Method   Final     % Neutrophils 09/05/2017 89.5  % Final     % Lymphocytes 09/05/2017 7.2  % Final     % Monocytes 09/05/2017 2.0  % Final     % Eosinophils 09/05/2017 1.1  % Final     % Basophils 09/05/2017 0.2  % Final     Absolute Neutrophil 09/05/2017 4.8  1.6 - 8.3 10e9/L Final     Absolute Lymphocytes 09/05/2017 0.4* 0.8 - 5.3 10e9/L Final     Absolute Monocytes 09/05/2017 0.1  0.0 - 1.3 10e9/L Final     Absolute Eosinophils 09/05/2017 0.1  0.0 - 0.7 10e9/L Final     Absolute Basophils 09/05/2017 0.0  0.0 - 0.2 10e9/L Final     Bilirubin Direct 09/05/2017 0.1  0.0 - 0.2 mg/dL Final     Bilirubin Total 09/05/2017 0.4  0.2 - 1.3 mg/dL Final     Albumin 09/05/2017 3.2* 3.4 - 5.0 g/dL Final     Protein Total 09/05/2017 7.0  6.8 - 8.8 g/dL Final     Alkaline Phosphatase 09/05/2017 132  40 - 150 U/L Final     ALT 09/05/2017 43  0 - 70 U/L Final     AST 09/05/2017 28  0 - 45 U/L Final     Creatinine 09/05/2017 0.97  0.66 - 1.25 mg/dL Final     GFR Estimate 09/05/2017 77  >60 mL/min/1.7m2  Final    Non  GFR Calc     GFR Estimate If Black 09/05/2017 >90  >60 mL/min/1.7m2 Final    African American GFR Calc     Uric Acid 09/05/2017 3.5  3.5 - 7.2 mg/dL Final       Erika Matos CMA  3/15/2018 3:39 PM

## 2018-03-15 NOTE — TELEPHONE ENCOUNTER
"Requested Prescriptions   Pending Prescriptions Disp Refills     allopurinol (ZYLOPRIM) 100 MG tablet  Last Written Prescription Date:  09/05/17  Last Fill Quantity: 180,  # refills: 1   Last Office Visit with [facility], [facility] or [facility] prescribing provider:  09/05/17   Future Office Visit:      180 tablet 1     Sig: Take 2 tablets (200 mg) by mouth daily    Gout Agents Protocol Failed    3/15/2018  2:28 PM       Failed - Uric acid greater than or equal to 6 on file in past 12 months    Recent Labs   Lab Test  09/05/17   1236   URIC  3.5     If level is 6mg/dL or greater, ok to refill one time and refer to provider.          Failed - Recent (12 mo) or future (30 days) visit within the authorizing provider's specialty    Patient had office visit in the last 12 months or has a visit in the next 30 days with authorizing provider or within the authorizing provider's specialty.  See \"Patient Info\" tab in inbasket, or \"Choose Columns\" in Meds & Orders section of the refill encounter.           Passed - CBC on file in past 12 months    Recent Labs   Lab Test  09/05/17   1236   WBC  5.4   RBC  4.63   HGB  13.2*   HCT  37.7*   PLT  125*            Passed - ALT on file in past 12 months    Recent Labs   Lab Test  09/05/17   1236   ALT  43            Passed - Patient is age 18 or older       Passed - Normal serum creatinine on file in the past 12 months    Recent Labs   Lab Test  09/05/17   1236   03/08/16   0745   CR  0.97   < >   --    CRPOC   --    --   1.1    < > = values in this interval not displayed.             hydroxychloroquine (PLAQUENIL) 200 MG tablet  Last Written Prescription Date:  09/05/17  Last Fill Quantity: 90,  # refills: 1   Last Office Visit with [facility], [facility] or [facility] prescribing provider:  09/05/   Future Office Visit:    180 tablet 1     Sig: Take 1 tablet (200 mg) by mouth 2 times daily    There is no refill protocol information for this order          "

## 2018-03-20 NOTE — TELEPHONE ENCOUNTER
Left message for patient to call back to [phone]. Appointment is needed.  Erika Matos CMA  3/20/2018 3:19 PM

## 2018-04-02 NOTE — TELEPHONE ENCOUNTER
Last Clinic Visit:    1/20/17    NV: NONE  OVERDUE MD APPT.   30 DAY RF   [facility]. has been notified to contact the pt for MD APPT

## 2018-04-05 NOTE — TELEPHONE ENCOUNTER
Patient needs to make an appt for more refills I have tried to call patient several times. .Viola Bird LPN Staff Nurse

## 2018-04-06 NOTE — TELEPHONE ENCOUNTER
Called patient, was not able to leave message. Will attempt later.  Erika Matos CMA  4/6/2018 9:58 AM

## 2018-07-30 NOTE — TELEPHONE ENCOUNTER
Spoke to patient for status update.    Patient recently had ER trip to [facility] 7/29 (see care everywhere) for abd pain.    Patient was not admitted.     Patient due for Dr. Cadena and dedicated liver imaging follow up (email sent to Teresa Zamora RN at [facility] to set up follow up). Patient has been following with oncology (see care everywhere).     Patient reports he has not be able to lose any additional weight which is mental hard for him. He reports giving it a good try, but with no results.     Plan: for patient to call me later in the week if he does not hear from [facility] to set up follow up appt.

## 2019-01-02 ENCOUNTER — POST MORTEM DOCUMENTATION (OUTPATIENT)
Dept: TRANSPLANT | Facility: CLINIC | Age: 69
End: 2019-01-02

## 2019-01-02 NOTE — PROGRESS NOTES
"Patient passed at home when wife found him unresponsive.     EMS tried to revive, but they were unable.    Sister said they are not doing autopsy and are calling it \"natural causes\"    Epic updated and patient removed from [facility] list.   "

## 2020-06-24 NOTE — PROGRESS NOTES
Following submitted for 29 HCC points to start on 8/1517    This is the 3rd extension of a previously approved exception that does not meet criteria for automatic approval:  This is a 66 year old with HCV cirrhosis who had a CT on 11/3/15 showing a 5a HCC lesion at 1.5 cm with an AFP of 7 on 11/6/15. The patient had a CT on 12/14/15 showing the lesion had grown to 2.5 cm and was treated with RFA. Follow up MRI on 7/17/17 showed no residual HCC and an AFP of 4 on 6/22/17. This patient has had a great response to treatment and is within [place] criteria. We are requesting the patient be granted the 29 HCC exception points.    LET applied

## (undated) RX ORDER — DOBUTAMINE HYDROCHLORIDE 200 MG/100ML
INJECTION INTRAVENOUS
Status: DISPENSED
Start: 2017-06-22

## (undated) RX ORDER — METOPROLOL TARTRATE 1 MG/ML
INJECTION, SOLUTION INTRAVENOUS
Status: DISPENSED
Start: 2017-06-22